# Patient Record
Sex: MALE | Race: OTHER | Employment: FULL TIME
[De-identification: names, ages, dates, MRNs, and addresses within clinical notes are randomized per-mention and may not be internally consistent; named-entity substitution may affect disease eponyms.]

---

## 2024-07-24 ENCOUNTER — APPOINTMENT (OUTPATIENT)
Facility: HOSPITAL | Age: 39
End: 2024-07-24
Payer: COMMERCIAL

## 2024-07-24 ENCOUNTER — HOSPITAL ENCOUNTER (EMERGENCY)
Facility: HOSPITAL | Age: 39
Discharge: HOME | End: 2024-07-24
Payer: COMMERCIAL

## 2024-07-24 VITALS
WEIGHT: 178 LBS | TEMPERATURE: 98.3 F | HEIGHT: 65 IN | BODY MASS INDEX: 29.66 KG/M2 | DIASTOLIC BLOOD PRESSURE: 93 MMHG | SYSTOLIC BLOOD PRESSURE: 145 MMHG | OXYGEN SATURATION: 100 % | RESPIRATION RATE: 16 BRPM | HEART RATE: 75 BPM

## 2024-07-24 DIAGNOSIS — S82.832A CLOSED FRACTURE OF DISTAL END OF LEFT FIBULA, UNSPECIFIED FRACTURE MORPHOLOGY, INITIAL ENCOUNTER: ICD-10-CM

## 2024-07-24 DIAGNOSIS — S52.572A OTHER CLOSED INTRA-ARTICULAR FRACTURE OF DISTAL END OF LEFT RADIUS, INITIAL ENCOUNTER: Primary | ICD-10-CM

## 2024-07-24 PROCEDURE — 6370000000 HC RX 637 (ALT 250 FOR IP)

## 2024-07-24 PROCEDURE — 73110 X-RAY EXAM OF WRIST: CPT

## 2024-07-24 PROCEDURE — 25605 CLTX DST RDL FX/EPHYS SEP W/: CPT

## 2024-07-24 PROCEDURE — 99283 EMERGENCY DEPT VISIT LOW MDM: CPT

## 2024-07-24 RX ORDER — OXYCODONE HYDROCHLORIDE AND ACETAMINOPHEN 5; 325 MG/1; MG/1
1 TABLET ORAL
Status: COMPLETED | OUTPATIENT
Start: 2024-07-24 | End: 2024-07-24

## 2024-07-24 RX ORDER — OXYCODONE HYDROCHLORIDE AND ACETAMINOPHEN 5; 325 MG/1; MG/1
1 TABLET ORAL EVERY 6 HOURS PRN
Qty: 9 TABLET | Refills: 0 | Status: SHIPPED | OUTPATIENT
Start: 2024-07-24 | End: 2024-07-27

## 2024-07-24 RX ADMIN — OXYCODONE HYDROCHLORIDE AND ACETAMINOPHEN 1 TABLET: 5; 325 TABLET ORAL at 11:31

## 2024-07-24 NOTE — DISCHARGE INSTRUCTIONS
Thank you!  Thank you for allowing me to care for you in the emergency department. It is my goal to provide you with excellent care. If you have not received excellent quality care, please ask to speak to the nurse manager. Please fill out the survey that will come to you by mail or email since we listen to your feedback!     Below you will find a list of your tests from today's visit.  Should you have any questions, please do not hesitate to call the emergency department.    Labs  No results found for this or any previous visit (from the past 12 hour(s)).    Radiologic Studies  XR WRIST LEFT (MIN 3 VIEWS)   Final Result   Distal radial and ulnar fractures as described      Electronically signed by Rod Tejeda      XR WRIST LEFT (MIN 3 VIEWS)    (Results Pending)     [unfilled]  [unfilled]  ------------------------------------------------------------------------------------------------------------  The exam and treatment you received in the Emergency Department were for an urgent problem and are not intended as complete care. It is important that you follow-up with a doctor, nurse practitioner, or physician assistant to:  (1) confirm your diagnosis,  (2) re-evaluation of changes in your illness and treatment, and  (3) for ongoing care. Please take your discharge instructions with you when you go to your follow-up appointment.     If you have any problem arranging a follow-up appointment, contact the Emergency Department.  If your symptoms become worse or you do not improve as expected and you are unable to reach your health care provider, please return to the Emergency Department. We are available 24 hours a day.     If a prescription has been provided, please have it filled as soon as possible to prevent a delay in treatment. If you have any questions or reservations about taking the medication due to side effects or interactions with other medications, please call your primary care provider or contact the ER.

## 2024-07-24 NOTE — ED PROVIDER NOTES
[facility] EMERGENCY DEPT  EMERGENCY DEPARTMENT HISTORY AND PHYSICAL EXAM      Date: 7/24/2024  Patient Name: Tani Diez  MRN: [id number]  YOB: 1985  Date of evaluation: 7/24/2024  Provider: Gala Doherty PA-C   Note Started: 10:49 AM EDT 7/24/24    HISTORY OF PRESENT ILLNESS     Chief Complaint   Patient presents with    Wrist Injury       History Provided By: Patient    HPI: Tani Diez is a 39 y.o. male with no significant past medical history, presents for wrist injury. 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 He immediately applied ice. He denies any numbness tingling or weakness in the hand.     PAST MEDICAL HISTORY   Past Medical History:  No past medical history on file.    Past Surgical History:  No past surgical history on file.    Family History:  No family history on file.    Social History:       Allergies:  No Known Allergies    PCP: No primary care provider on file.    Current Meds:   No current facility-administered medications for this encounter.     Current Outpatient Medications   Medication Sig Dispense Refill    oxyCODONE-acetaminophen (PERCOCET) 5-325 MG per tablet Take 1 tablet by mouth every 6 hours as needed for Pain for up to 3 days. Intended supply: 3 days. Take lowest dose possible to manage pain Max Daily Amount: 4 tablets 9 tablet 0       Social Determinants of Health:   Social Determinants of Health     Tobacco Use: Not on file   Alcohol Use: Not At Risk (7/24/2024)    AUDIT-C     Frequency of Alcohol Consumption: Never     Average Number of Drinks: Patient does not drink     Frequency of Binge Drinking: Never   Financial Resource Strain: Not on file   Food Insecurity: Not on file   Transportation Needs: Not on file   Physical Activity: Not on file   Stress: Not on file   Social Connections: Not on file   Intimate Partner Violence: Not on file   Depression: Not on file   Housing Stability: Not on file

## 2024-07-26 ENCOUNTER — OFFICE VISIT (OUTPATIENT)
Age: 39
End: 2024-07-26

## 2024-07-26 ENCOUNTER — TELEPHONE (OUTPATIENT)
Age: 39
End: 2024-07-26

## 2024-07-26 ENCOUNTER — HOSPITAL ENCOUNTER (OUTPATIENT)
Facility: HOSPITAL | Age: 39
Discharge: HOME | End: 2024-07-26
Attending: ORTHOPAEDIC SURGERY
Payer: COMMERCIAL

## 2024-07-26 VITALS
DIASTOLIC BLOOD PRESSURE: 95 MMHG | HEIGHT: 65 IN | HEART RATE: 89 BPM | WEIGHT: 175.49 LBS | SYSTOLIC BLOOD PRESSURE: 155 MMHG | OXYGEN SATURATION: 95 % | BODY MASS INDEX: 29.24 KG/M2 | TEMPERATURE: 98.8 F

## 2024-07-26 DIAGNOSIS — S52.572A OTHER CLOSED INTRA-ARTICULAR FRACTURE OF DISTAL END OF LEFT RADIUS, INITIAL ENCOUNTER: Primary | ICD-10-CM

## 2024-07-26 DIAGNOSIS — S52.572A CLOSED DIE PUNCH FRACTURE OF DISTAL RADIUS, LEFT, INITIAL ENCOUNTER: ICD-10-CM

## 2024-07-26 DIAGNOSIS — S52.572A OTHER CLOSED INTRA-ARTICULAR FRACTURE OF DISTAL END OF LEFT RADIUS, INITIAL ENCOUNTER: ICD-10-CM

## 2024-07-26 PROCEDURE — 73200 CT UPPER EXTREMITY W/O DYE: CPT

## 2024-07-26 RX ORDER — SODIUM CHLORIDE 0.9 % (FLUSH) 0.9 %
5-40 SYRINGE (ML) INJECTION PRN
OUTPATIENT
Start: 2024-07-26

## 2024-07-26 RX ORDER — ACETAMINOPHEN 325 MG/1
1000 TABLET ORAL ONCE
OUTPATIENT
Start: 2024-07-26 | End: 2024-07-26

## 2024-07-26 RX ORDER — CELECOXIB 200 MG/1
200 CAPSULE ORAL ONCE
OUTPATIENT
Start: 2024-07-26 | End: 2024-07-26

## 2024-07-26 RX ORDER — SODIUM CHLORIDE 0.9 % (FLUSH) 0.9 %
5-40 SYRINGE (ML) INJECTION EVERY 12 HOURS SCHEDULED
OUTPATIENT
Start: 2024-07-26

## 2024-07-26 RX ORDER — SODIUM CHLORIDE 9 MG/ML
INJECTION, SOLUTION INTRAVENOUS PRN
OUTPATIENT
Start: 2024-07-26

## 2024-07-26 NOTE — H&P (VIEW-ONLY)
SUBJECTIVE/OBJECTIVE:  Tani Diez (: 1985) is a 39 y.o. male.    He is right hand dominant coming in with left distal radius fracture after a fall at work on on 24 off the bed of a trailer.  He was seen in the ED where x-rays were done and he underwent closed reduction and placement in a sugartong splint.  He reports some pain over left shoulder, left sided ribs, left thigh.  He is taking percocet for pain.  He denies numbness/paresthesias.        No Known Allergies    Current Outpatient Medications   Medication Sig Dispense Refill    oxyCODONE-acetaminophen (PERCOCET) 5-325 MG per tablet Take 1 tablet by mouth every 6 hours as needed for Pain for up to 3 days. Intended supply: 3 days. Take lowest dose possible to manage pain Max Daily Amount: 4 tablets 9 tablet 0     No current facility-administered medications for this visit.        Social History     Socioeconomic History    Marital status:      Spouse name: Not on file    Number of children: Not on file    Years of education: Not on file    Highest education level: Not on file   Occupational History    Not on file   Tobacco Use    Smoking status: Not on file    Smokeless tobacco: Not on file   Substance and Sexual Activity    Alcohol use: Not on file    Drug use: Not on file    Sexual activity: Not on file   Other Topics Concern    Not on file   Social History Narrative    Not on file     Social Determinants of Health     Financial Resource Strain: Not on file   Food Insecurity: Not on file   Transportation Needs: Not on file   Physical Activity: Not on file   Stress: Not on file   Social Connections: Not on file   Intimate Partner Violence: Not on file   Housing Stability: Not on file       No past medical history on file.   None    No past surgical history on file.  None    No family history on file.            REVIEW OF SYSTEMS:    Patient denies any recent fever, chills, nausea, vomiting, chest pain, or shortness of

## 2024-07-26 NOTE — PROGRESS NOTES
Identified pt with two pt identifiers (name and ). Reviewed chart in preparation for visit and have obtained necessary documentation.    Tani Diez is a 39 y.o. male  Chief Complaint   Patient presents with    Wrist Injury     Reason for Visit: NP, ED F/U, Left Wrist Pain   Pain level: 5  Patient states he fell from the left side, front of a trailer and fell to the ground. The employer is paying for this injury. (WC claim) The medication provided for pain management is not working and does not work for the inflammation. The pain is a throbbing pain and it comes and goes. Patient states the next day after fall he has pain on his entire left side (back, shoulder, leg)     used during the visit: Dialoggy #: [id number]     BP (!) 155/95 (Site: Right Upper Arm, Position: Sitting, Cuff Size: Medium Adult)   Pulse 89   Temp 98.8 °F (37.1 °C) (Oral)   Ht 1.651 m (5' 5\")   Wt 79.6 kg (175 lb 7.8 oz)   SpO2 95%   BMI 29.20 kg/m²     1. Have you been to the ER, urgent care clinic since your last visit?  Hospitalized since your last visit?yes - [facility], 24, Left Wrist pain     2. Have you seen or consulted any other health care providers outside of the [facility] since your last visit?  Include any pap smears or colon screening. No    Patient has MyChart, just has to set it up.  
to authenticate this note.  -- Robyn Rosa MD

## 2024-07-26 NOTE — TELEPHONE ENCOUNTER
Placed call out to patient's employer, Kalyn [employer], to try and obtain the W/C info. Was given the following information:   W/C Company: [employer]  Claim #: [id number]  W/C Address: [address] [place] [id number]  Name of : Bisi Giang, phone #: [phone] ext. [phone]  : None assigned as of yet    Placed call to Bisi Giang and ANIRUDH asking if auth letter can be sent, where can we start sending office notes to get things authorized and if there is anymore info she may give us. Left our contact info in VM.

## 2024-07-29 ENCOUNTER — TELEPHONE (OUTPATIENT)
Age: 39
End: 2024-07-29

## 2024-07-29 ENCOUNTER — ANESTHESIA EVENT (OUTPATIENT)
Facility: HOSPITAL | Age: 39
End: 2024-07-29
Payer: COMMERCIAL

## 2024-07-29 NOTE — TELEPHONE ENCOUNTER
Patient called in requesting to know more information about their surgery tomorrow with Dr. Rosa.  was on the line with us, informed patient hospital should be calling by end of day today with more info. Patient understood.

## 2024-07-30 ENCOUNTER — HOSPITAL ENCOUNTER (OUTPATIENT)
Facility: HOSPITAL | Age: 39
Setting detail: OUTPATIENT SURGERY
Discharge: HOME | End: 2024-07-30
Attending: ORTHOPAEDIC SURGERY | Admitting: ORTHOPAEDIC SURGERY
Payer: COMMERCIAL

## 2024-07-30 ENCOUNTER — APPOINTMENT (OUTPATIENT)
Facility: HOSPITAL | Age: 39
End: 2024-07-30
Attending: ORTHOPAEDIC SURGERY
Payer: COMMERCIAL

## 2024-07-30 ENCOUNTER — ANESTHESIA (OUTPATIENT)
Facility: HOSPITAL | Age: 39
End: 2024-07-30
Payer: COMMERCIAL

## 2024-07-30 VITALS
RESPIRATION RATE: 18 BRPM | SYSTOLIC BLOOD PRESSURE: 152 MMHG | DIASTOLIC BLOOD PRESSURE: 98 MMHG | OXYGEN SATURATION: 96 % | HEART RATE: 89 BPM | TEMPERATURE: 98.2 F

## 2024-07-30 DIAGNOSIS — S52.572A CLOSED DIE PUNCH FRACTURE OF DISTAL RADIUS, LEFT, INITIAL ENCOUNTER: Primary | ICD-10-CM

## 2024-07-30 PROCEDURE — 7100000010 HC PHASE II RECOVERY - FIRST 15 MIN: Performed by: ORTHOPAEDIC SURGERY

## 2024-07-30 PROCEDURE — 3700000000 HC ANESTHESIA ATTENDED CARE: Performed by: ORTHOPAEDIC SURGERY

## 2024-07-30 PROCEDURE — 3600000014 HC SURGERY LEVEL 4 ADDTL 15MIN: Performed by: ORTHOPAEDIC SURGERY

## 2024-07-30 PROCEDURE — C1713 ANCHOR/SCREW BN/BN,TIS/BN: HCPCS | Performed by: ORTHOPAEDIC SURGERY

## 2024-07-30 PROCEDURE — 6370000000 HC RX 637 (ALT 250 FOR IP): Performed by: ORTHOPAEDIC SURGERY

## 2024-07-30 PROCEDURE — 2580000003 HC RX 258

## 2024-07-30 PROCEDURE — 6360000002 HC RX W HCPCS: Performed by: NURSE ANESTHETIST, CERTIFIED REGISTERED

## 2024-07-30 PROCEDURE — 2709999900 HC NON-CHARGEABLE SUPPLY: Performed by: ORTHOPAEDIC SURGERY

## 2024-07-30 PROCEDURE — 6360000002 HC RX W HCPCS

## 2024-07-30 PROCEDURE — 7100000011 HC PHASE II RECOVERY - ADDTL 15 MIN: Performed by: ORTHOPAEDIC SURGERY

## 2024-07-30 PROCEDURE — 6360000002 HC RX W HCPCS: Performed by: ORTHOPAEDIC SURGERY

## 2024-07-30 PROCEDURE — 76000 FLUOROSCOPY <1 HR PHYS/QHP: CPT

## 2024-07-30 PROCEDURE — 3700000001 HC ADD 15 MINUTES (ANESTHESIA): Performed by: ORTHOPAEDIC SURGERY

## 2024-07-30 PROCEDURE — 2500000003 HC RX 250 WO HCPCS

## 2024-07-30 PROCEDURE — 3600000004 HC SURGERY LEVEL 4 BASE: Performed by: ORTHOPAEDIC SURGERY

## 2024-07-30 PROCEDURE — 7100000000 HC PACU RECOVERY - FIRST 15 MIN: Performed by: ORTHOPAEDIC SURGERY

## 2024-07-30 PROCEDURE — 2500000003 HC RX 250 WO HCPCS: Performed by: NURSE ANESTHETIST, CERTIFIED REGISTERED

## 2024-07-30 PROCEDURE — 2580000003 HC RX 258: Performed by: ORTHOPAEDIC SURGERY

## 2024-07-30 PROCEDURE — 6370000000 HC RX 637 (ALT 250 FOR IP): Performed by: ANESTHESIOLOGY

## 2024-07-30 PROCEDURE — 2720000010 HC SURG SUPPLY STERILE: Performed by: ORTHOPAEDIC SURGERY

## 2024-07-30 PROCEDURE — 2500000003 HC RX 250 WO HCPCS: Performed by: ANESTHESIOLOGY

## 2024-07-30 PROCEDURE — 7100000001 HC PACU RECOVERY - ADDTL 15 MIN: Performed by: ORTHOPAEDIC SURGERY

## 2024-07-30 DEVICE — SCREW BNE L16MM DIA2.4MM DST RAD VOLAR S STL ST VAR ANG LOK: Type: IMPLANTABLE DEVICE | Site: WRIST | Status: FUNCTIONAL

## 2024-07-30 DEVICE — SCREW BNE L16MM DIA2.7MM CORT S STL ST T8 STARDRV RECESS: Type: IMPLANTABLE DEVICE | Site: WRIST | Status: FUNCTIONAL

## 2024-07-30 DEVICE — PLATE BNE L45MM 6X2 H L VOLAR DST RAD S STL VAR ANG LOK: Type: IMPLANTABLE DEVICE | Site: WRIST | Status: FUNCTIONAL

## 2024-07-30 DEVICE — SCREW BNE L18MM DIA2.4MM DST RAD VOLAR S STL ST VAR ANG LOK: Type: IMPLANTABLE DEVICE | Site: WRIST | Status: FUNCTIONAL

## 2024-07-30 DEVICE — SCREW BNE L20MM DIA2.4MM DST RAD VOLAR S STL ST VAR ANG LOK: Type: IMPLANTABLE DEVICE | Site: WRIST | Status: FUNCTIONAL

## 2024-07-30 RX ORDER — OXYCODONE HYDROCHLORIDE 5 MG/1
5 TABLET ORAL PRN
Status: COMPLETED | OUTPATIENT
Start: 2024-07-30 | End: 2024-07-30

## 2024-07-30 RX ORDER — OXYCODONE HYDROCHLORIDE 5 MG/1
5 TABLET ORAL EVERY 4 HOURS PRN
Qty: 30 TABLET | Refills: 0 | Status: SHIPPED | OUTPATIENT
Start: 2024-07-30 | End: 2024-08-04

## 2024-07-30 RX ORDER — SODIUM CHLORIDE 0.9 % (FLUSH) 0.9 %
5-40 SYRINGE (ML) INJECTION EVERY 12 HOURS SCHEDULED
Status: DISCONTINUED | OUTPATIENT
Start: 2024-07-30 | End: 2024-07-30 | Stop reason: HOSPADM

## 2024-07-30 RX ORDER — DEXTROSE MONOHYDRATE 100 MG/ML
INJECTION, SOLUTION INTRAVENOUS CONTINUOUS PRN
Status: DISCONTINUED | OUTPATIENT
Start: 2024-07-30 | End: 2024-07-30 | Stop reason: HOSPADM

## 2024-07-30 RX ORDER — ONDANSETRON 2 MG/ML
4 INJECTION INTRAMUSCULAR; INTRAVENOUS
Status: DISCONTINUED | OUTPATIENT
Start: 2024-07-30 | End: 2024-07-30 | Stop reason: HOSPADM

## 2024-07-30 RX ORDER — NALOXONE HYDROCHLORIDE 0.4 MG/ML
INJECTION, SOLUTION INTRAMUSCULAR; INTRAVENOUS; SUBCUTANEOUS PRN
Status: DISCONTINUED | OUTPATIENT
Start: 2024-07-30 | End: 2024-07-30 | Stop reason: HOSPADM

## 2024-07-30 RX ORDER — LIDOCAINE HYDROCHLORIDE 20 MG/ML
INJECTION, SOLUTION EPIDURAL; INFILTRATION; INTRACAUDAL; PERINEURAL PRN
Status: DISCONTINUED | OUTPATIENT
Start: 2024-07-30 | End: 2024-07-30 | Stop reason: SDUPTHER

## 2024-07-30 RX ORDER — IBUPROFEN 600 MG/1
600 TABLET ORAL 3 TIMES DAILY PRN
Qty: 90 TABLET | Refills: 2 | Status: SHIPPED | OUTPATIENT
Start: 2024-07-30

## 2024-07-30 RX ORDER — OXYCODONE HYDROCHLORIDE 5 MG/1
10 TABLET ORAL PRN
Status: COMPLETED | OUTPATIENT
Start: 2024-07-30 | End: 2024-07-30

## 2024-07-30 RX ORDER — METOCLOPRAMIDE HYDROCHLORIDE 5 MG/ML
10 INJECTION INTRAMUSCULAR; INTRAVENOUS
Status: DISCONTINUED | OUTPATIENT
Start: 2024-07-30 | End: 2024-07-30 | Stop reason: HOSPADM

## 2024-07-30 RX ORDER — SENNA AND DOCUSATE SODIUM 50; 8.6 MG/1; MG/1
1 TABLET, FILM COATED ORAL 2 TIMES DAILY PRN
Qty: 30 TABLET | Refills: 0 | Status: SHIPPED | OUTPATIENT
Start: 2024-07-30

## 2024-07-30 RX ORDER — FENTANYL CITRATE 50 UG/ML
INJECTION, SOLUTION INTRAMUSCULAR; INTRAVENOUS PRN
Status: DISCONTINUED | OUTPATIENT
Start: 2024-07-30 | End: 2024-07-30 | Stop reason: SDUPTHER

## 2024-07-30 RX ORDER — MIDAZOLAM HYDROCHLORIDE 1 MG/ML
INJECTION INTRAMUSCULAR; INTRAVENOUS PRN
Status: DISCONTINUED | OUTPATIENT
Start: 2024-07-30 | End: 2024-07-30 | Stop reason: SDUPTHER

## 2024-07-30 RX ORDER — HYDRALAZINE HYDROCHLORIDE 20 MG/ML
10 INJECTION INTRAMUSCULAR; INTRAVENOUS
Status: DISCONTINUED | OUTPATIENT
Start: 2024-07-30 | End: 2024-07-30 | Stop reason: HOSPADM

## 2024-07-30 RX ORDER — SODIUM CHLORIDE, SODIUM LACTATE, POTASSIUM CHLORIDE, CALCIUM CHLORIDE 600; 310; 30; 20 MG/100ML; MG/100ML; MG/100ML; MG/100ML
INJECTION, SOLUTION INTRAVENOUS ONCE
Status: DISCONTINUED | OUTPATIENT
Start: 2024-07-30 | End: 2024-07-30 | Stop reason: HOSPADM

## 2024-07-30 RX ORDER — DIPHENHYDRAMINE HYDROCHLORIDE 50 MG/ML
12.5 INJECTION INTRAMUSCULAR; INTRAVENOUS
Status: DISCONTINUED | OUTPATIENT
Start: 2024-07-30 | End: 2024-07-30 | Stop reason: HOSPADM

## 2024-07-30 RX ORDER — ONDANSETRON 2 MG/ML
INJECTION INTRAMUSCULAR; INTRAVENOUS PRN
Status: DISCONTINUED | OUTPATIENT
Start: 2024-07-30 | End: 2024-07-30 | Stop reason: SDUPTHER

## 2024-07-30 RX ORDER — LIDOCAINE 4 G/G
1 PATCH TOPICAL AS NEEDED
Status: DISCONTINUED | OUTPATIENT
Start: 2024-07-30 | End: 2024-07-30 | Stop reason: HOSPADM

## 2024-07-30 RX ORDER — MEPERIDINE HYDROCHLORIDE 25 MG/ML
12.5 INJECTION INTRAMUSCULAR; INTRAVENOUS; SUBCUTANEOUS EVERY 5 MIN PRN
Status: DISCONTINUED | OUTPATIENT
Start: 2024-07-30 | End: 2024-07-30 | Stop reason: HOSPADM

## 2024-07-30 RX ORDER — HYDROMORPHONE HYDROCHLORIDE 1 MG/ML
0.5 INJECTION, SOLUTION INTRAMUSCULAR; INTRAVENOUS; SUBCUTANEOUS EVERY 5 MIN PRN
Status: DISCONTINUED | OUTPATIENT
Start: 2024-07-30 | End: 2024-07-30 | Stop reason: HOSPADM

## 2024-07-30 RX ORDER — ACETAMINOPHEN 325 MG/1
1000 TABLET ORAL ONCE
Status: COMPLETED | OUTPATIENT
Start: 2024-07-30 | End: 2024-07-30

## 2024-07-30 RX ORDER — ONDANSETRON 4 MG/1
4 TABLET, ORALLY DISINTEGRATING ORAL EVERY 8 HOURS PRN
Qty: 10 TABLET | Refills: 1 | Status: SHIPPED | OUTPATIENT
Start: 2024-07-30

## 2024-07-30 RX ORDER — IPRATROPIUM BROMIDE AND ALBUTEROL SULFATE 2.5; .5 MG/3ML; MG/3ML
1 SOLUTION RESPIRATORY (INHALATION)
Status: DISCONTINUED | OUTPATIENT
Start: 2024-07-30 | End: 2024-07-30 | Stop reason: HOSPADM

## 2024-07-30 RX ORDER — BUPIVACAINE HYDROCHLORIDE 5 MG/ML
INJECTION, SOLUTION EPIDURAL; INTRACAUDAL PRN
Status: DISCONTINUED | OUTPATIENT
Start: 2024-07-30 | End: 2024-07-30 | Stop reason: HOSPADM

## 2024-07-30 RX ORDER — SODIUM CHLORIDE 0.9 % (FLUSH) 0.9 %
5-40 SYRINGE (ML) INJECTION PRN
Status: DISCONTINUED | OUTPATIENT
Start: 2024-07-30 | End: 2024-07-30 | Stop reason: HOSPADM

## 2024-07-30 RX ORDER — ACETAMINOPHEN 500 MG
500 TABLET ORAL EVERY 6 HOURS PRN
Qty: 50 TABLET | Refills: 1 | Status: SHIPPED | OUTPATIENT
Start: 2024-07-30

## 2024-07-30 RX ORDER — SODIUM CHLORIDE 9 MG/ML
INJECTION, SOLUTION INTRAVENOUS PRN
Status: DISCONTINUED | OUTPATIENT
Start: 2024-07-30 | End: 2024-07-30 | Stop reason: HOSPADM

## 2024-07-30 RX ORDER — GLUCAGON 1 MG/ML
1 KIT INJECTION PRN
Status: DISCONTINUED | OUTPATIENT
Start: 2024-07-30 | End: 2024-07-30 | Stop reason: HOSPADM

## 2024-07-30 RX ORDER — LABETALOL HYDROCHLORIDE 5 MG/ML
10 INJECTION, SOLUTION INTRAVENOUS
Status: DISCONTINUED | OUTPATIENT
Start: 2024-07-30 | End: 2024-07-30 | Stop reason: HOSPADM

## 2024-07-30 RX ORDER — FENTANYL CITRATE 50 UG/ML
50 INJECTION, SOLUTION INTRAMUSCULAR; INTRAVENOUS EVERY 5 MIN PRN
Status: DISCONTINUED | OUTPATIENT
Start: 2024-07-30 | End: 2024-07-30 | Stop reason: HOSPADM

## 2024-07-30 RX ORDER — DEXAMETHASONE SODIUM PHOSPHATE 4 MG/ML
INJECTION, SOLUTION INTRA-ARTICULAR; INTRALESIONAL; INTRAMUSCULAR; INTRAVENOUS; SOFT TISSUE PRN
Status: DISCONTINUED | OUTPATIENT
Start: 2024-07-30 | End: 2024-07-30 | Stop reason: SDUPTHER

## 2024-07-30 RX ORDER — PROPOFOL 10 MG/ML
INJECTION, EMULSION INTRAVENOUS PRN
Status: DISCONTINUED | OUTPATIENT
Start: 2024-07-30 | End: 2024-07-30 | Stop reason: SDUPTHER

## 2024-07-30 RX ORDER — SODIUM CHLORIDE, SODIUM LACTATE, POTASSIUM CHLORIDE, CALCIUM CHLORIDE 600; 310; 30; 20 MG/100ML; MG/100ML; MG/100ML; MG/100ML
INJECTION, SOLUTION INTRAVENOUS CONTINUOUS PRN
Status: DISCONTINUED | OUTPATIENT
Start: 2024-07-30 | End: 2024-07-30 | Stop reason: SDUPTHER

## 2024-07-30 RX ORDER — LORAZEPAM 2 MG/ML
0.5 INJECTION INTRAMUSCULAR
Status: DISCONTINUED | OUTPATIENT
Start: 2024-07-30 | End: 2024-07-30 | Stop reason: HOSPADM

## 2024-07-30 RX ORDER — ROCURONIUM BROMIDE 10 MG/ML
INJECTION, SOLUTION INTRAVENOUS PRN
Status: DISCONTINUED | OUTPATIENT
Start: 2024-07-30 | End: 2024-07-30 | Stop reason: SDUPTHER

## 2024-07-30 RX ORDER — CELECOXIB 200 MG/1
200 CAPSULE ORAL ONCE
Status: COMPLETED | OUTPATIENT
Start: 2024-07-30 | End: 2024-07-30

## 2024-07-30 RX ADMIN — SODIUM CHLORIDE, SODIUM LACTATE, POTASSIUM CHLORIDE, CALCIUM CHLORIDE: 600; 310; 30; 20 INJECTION, SOLUTION INTRAVENOUS at 09:52

## 2024-07-30 RX ADMIN — PROPOFOL 150 MG: 10 INJECTION, EMULSION INTRAVENOUS at 09:56

## 2024-07-30 RX ADMIN — CEFAZOLIN SODIUM 2000 MG: 1 INJECTION, POWDER, FOR SOLUTION INTRAMUSCULAR; INTRAVENOUS at 09:52

## 2024-07-30 RX ADMIN — CELECOXIB 200 MG: 200 CAPSULE ORAL at 09:36

## 2024-07-30 RX ADMIN — Medication 1 MG: at 11:37

## 2024-07-30 RX ADMIN — HYDROMORPHONE HYDROCHLORIDE 0.5 MG: 1 INJECTION, SOLUTION INTRAMUSCULAR; INTRAVENOUS; SUBCUTANEOUS at 12:22

## 2024-07-30 RX ADMIN — FENTANYL CITRATE 50 MCG: 50 INJECTION, SOLUTION INTRAMUSCULAR; INTRAVENOUS at 10:03

## 2024-07-30 RX ADMIN — MIDAZOLAM HYDROCHLORIDE 2 MG: 1 INJECTION INTRAMUSCULAR; INTRAVENOUS at 09:52

## 2024-07-30 RX ADMIN — SODIUM CHLORIDE, SODIUM LACTATE, POTASSIUM CHLORIDE, CALCIUM CHLORIDE: 600; 310; 30; 20 INJECTION, SOLUTION INTRAVENOUS at 11:08

## 2024-07-30 RX ADMIN — FENTANYL CITRATE 50 MCG: 50 INJECTION, SOLUTION INTRAMUSCULAR; INTRAVENOUS at 10:19

## 2024-07-30 RX ADMIN — ONDANSETRON 4 MG: 2 INJECTION INTRAMUSCULAR; INTRAVENOUS at 10:16

## 2024-07-30 RX ADMIN — ROCURONIUM BROMIDE 40 MG: 10 INJECTION, SOLUTION INTRAVENOUS at 10:08

## 2024-07-30 RX ADMIN — HYDROMORPHONE HYDROCHLORIDE 0.5 MG: 1 INJECTION, SOLUTION INTRAMUSCULAR; INTRAVENOUS; SUBCUTANEOUS at 12:15

## 2024-07-30 RX ADMIN — OXYCODONE 10 MG: 5 TABLET ORAL at 13:58

## 2024-07-30 RX ADMIN — LIDOCAINE HYDROCHLORIDE 100 MG: 20 INJECTION, SOLUTION EPIDURAL; INFILTRATION; INTRACAUDAL; PERINEURAL at 09:56

## 2024-07-30 RX ADMIN — PROPOFOL 50 MG: 10 INJECTION, EMULSION INTRAVENOUS at 10:02

## 2024-07-30 RX ADMIN — FENTANYL CITRATE 50 MCG: 50 INJECTION, SOLUTION INTRAMUSCULAR; INTRAVENOUS at 10:43

## 2024-07-30 RX ADMIN — DEXAMETHASONE SODIUM PHOSPHATE 8 MG: 4 INJECTION, SOLUTION INTRA-ARTICULAR; INTRALESIONAL; INTRAMUSCULAR; INTRAVENOUS; SOFT TISSUE at 10:12

## 2024-07-30 RX ADMIN — ACETAMINOPHEN 975 MG: 325 TABLET ORAL at 09:36

## 2024-07-30 RX ADMIN — FENTANYL CITRATE 50 MCG: 50 INJECTION, SOLUTION INTRAMUSCULAR; INTRAVENOUS at 11:00

## 2024-07-30 ASSESSMENT — PAIN DESCRIPTION - ORIENTATION
ORIENTATION: LEFT

## 2024-07-30 ASSESSMENT — PAIN SCALES - GENERAL
PAINLEVEL_OUTOF10: 3
PAINLEVEL_OUTOF10: 8
PAINLEVEL_OUTOF10: 10
PAINLEVEL_OUTOF10: 8
PAINLEVEL_OUTOF10: 8

## 2024-07-30 ASSESSMENT — PAIN DESCRIPTION - DESCRIPTORS
DESCRIPTORS: ACHING;DISCOMFORT
DESCRIPTORS: BURNING;THROBBING
DESCRIPTORS: ACHING;DISCOMFORT
DESCRIPTORS: ACHING;THROBBING

## 2024-07-30 ASSESSMENT — PAIN - FUNCTIONAL ASSESSMENT
PAIN_FUNCTIONAL_ASSESSMENT: 0-10

## 2024-07-30 ASSESSMENT — PAIN DESCRIPTION - LOCATION
LOCATION: ARM
LOCATION: WRIST

## 2024-07-30 NOTE — ANESTHESIA PRE PROCEDURE
Department of Anesthesiology  Preprocedure Note       Name:  Tani Diez   Age:  39 y.o.  :  1985                                          MRN:  [id number]         Date:  2024      Surgeon: Surgeon(s):  Robyn Rosa MD    Procedure: Procedure(s):  LEFT WRIST OPEN REDUCTION INTERNAL FIXATION    Medications prior to admission:   Prior to Admission medications    Medication Sig Start Date End Date Taking? Authorizing Provider   acetaminophen (TYLENOL) 500 MG tablet Take 1 tablet by mouth every 6 hours as needed for Pain 24  Yes Robyn Rosa MD   ondansetron (ZOFRAN-ODT) 4 MG disintegrating tablet Place 1 tablet under the tongue every 8 hours as needed for Nausea or Vomiting 24  Yes Robyn Rosa MD   oxyCODONE (ROXICODONE) 5 MG immediate release tablet Take 1 tablet by mouth every 4 hours as needed for Pain for up to 5 days. Intended supply: 5 days. Take lowest dose possible to manage pain Max Daily Amount: 30 mg 24 Yes Robyn Rosa MD   sennosides-docusate sodium (SENOKOT-S) 8.6-50 MG tablet Take 1 tablet by mouth 2 times daily as needed for Constipation 24  Yes Robyn Rosa MD   ibuprofen (ADVIL;MOTRIN) 600 MG tablet Take 1 tablet by mouth 3 times daily as needed for Pain 24  Yes Robyn Rosa MD       Current medications:    Current Facility-Administered Medications   Medication Dose Route Frequency Provider Last Rate Last Admin    sodium chloride flush 0.9 % injection 5-40 mL  5-40 mL IntraVENous 2 times per day Robyn Rosa MD        sodium chloride flush 0.9 % injection 5-40 mL  5-40 mL IntraVENous PRN Robyn Rosa MD        0.9 % sodium chloride infusion   IntraVENous PRN Robyn Rosa MD         Facility-Administered Medications Ordered in Other Encounters   Medication Dose Route Frequency Provider Last Rate Last Admin    midazolam (VERSED) injection   
none

## 2024-07-30 NOTE — DISCHARGE INSTRUCTIONS
** RETURN TO CARE INSTRUCTIONS **  Go the nearest emergency department immediately if you develop chest pain, shortness of breath, dizziness, temperature greater than 101.5 degrees Fahrenheit or other symptoms that you think are an emergency. If you have signs of an infection, such as a temperature over 101.5 degrees Fahrenheit, persistent wound drainage, redness, swelling, or increased pain, you should contact the [facility] Surgery [facility] immediately at [phone].     ** WEIGHT BEARING / ACTIVITY RESTRICTIONS **  Please remain non-weight bearing on operative hand until approval by your Orthopaedic Surgeon. Failure to observe these restrictions will increase your risk for complications that may delay your recovery. You may perform range of motion exercises to fingers and elbow. Keep operative extremity elevated above heart when sitting or lying down.    ** WOUND CARE **  Leave the splint and dressing in place until your first postoperative appointment.  Keep the splint clean, dry, and intact.  Cover with a plastic bag when showering and secure with tape or a rubberband.    *NEW MEDICATIONS*   Oxycodone 5 mg  - Take 1 tab by mouth every 4 hours as needed for pain - This is an opioid pain medication. It can impair your coordination and cause drowsiness. Do not take this medication while operating heavy machinery, driving automobiles, or performing other hazardous tasks. - This medication may cause constipation. Please take stool softening medications as prescribed or consider over-the-counter fiber supplements (e.g Metamucil) or laxative of choice as needed.    Ibuprofen 600mg:  - Take 1 tab by mouth every 8 hours for pain - This is a non-steroidal anti-inflammatory (NSAID) pain medication - To avoid risk of GI bleeding or kidney complications when combined with other NSAIDs, please review labels other over-the-counter medications and follow dosing directions before taking.     Zofran 4mg: - Dissolve 1 tab on

## 2024-07-30 NOTE — PERIOP NOTE
Discharge instructions printed and provided to patient and family with all questions answered in full using . PIV x1 removed with cath tip intact. Pt VSS and no signs of distress noted. Pt tolerating liquids and solids with no issues. Pt ambulating within room with no issues. Pt wheeled down to main entrance accompanied by staff. Pt condition stable at time of discharge.      Sling provided and applied to pt affected arm. Pt aware of date/time of follow-up appointment.

## 2024-07-30 NOTE — INTERVAL H&P NOTE
Update History & Physical    The patient's History and Physical of July 26, 2024 was reviewed with the patient and I examined the patient. There was no change. The surgical site was confirmed by the patient and me.     Plan: The risks, benefits, expected outcome, and alternative to the recommended procedure have been discussed with the patient. Patient understands and wants to proceed with the procedure.     Electronically signed by Robyn Rosa MD on 7/30/2024 at 9:24 AM

## 2024-07-30 NOTE — OP NOTE
Operative Note      Patient: Tani Diez  YOB: 1985  MRN: [id number]    Date of Procedure: 7/30/2024    Pre-Op Diagnosis Codes:     * Closed die punch fracture of distal radius, left, initial encounter [S52.572A]    Post-Op Diagnosis: Same       Procedure(s):  LEFT WRIST OPEN REDUCTION INTERNAL FIXATION    Surgeon(s):  Robyn Rosa MD    Assistant:   Surgical Assistant: Lilli Trammell    Anesthesia: General    Estimated Blood Loss (mL): less than 50     Complications: None    Specimens:   * No specimens in log *    Implants:  * No implants in log *      Drains: * No LDAs found *    Findings:  Infection Present At Time Of Surgery (PATOS) (choose all levels that have infection present):  No infection present  Other Findings: Left distal radius intra-articular fracture with dorsal comminution including involvement of the lunate fossa    Indications for the Procedure:  39-year-old right-hand-dominant  male who sustained 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 Discussed with the patient given the amount of dorsal comminution and dorsal tilt this increases the risk of further displacement of the fracture and if the fracture heals as it is this can lead todecreased range of motion and strength and posttraumatic arthritis of the wrist joint. Would recommend operative fixation with left distal radius with open reduction internal fixation . The risks, benefits, and alternatives of the above procedure were explained to the patient including the risks of infection, bleeding, damage to surrounding nerves, blood vessels, tendons, need for further procedures, stiffness, chronic pain, wound healing issues, DVTs, posttraumatic osteoarthritis, nonunion and malunion, carpal tunnel syndrome. The patient filled out informed consent.    Detailed Description of the Procedure  The patient was met in the

## 2024-08-12 ENCOUNTER — OFFICE VISIT (OUTPATIENT)
Age: 39
End: 2024-08-12

## 2024-08-12 VITALS
SYSTOLIC BLOOD PRESSURE: 139 MMHG | BODY MASS INDEX: 29.02 KG/M2 | TEMPERATURE: 98.5 F | RESPIRATION RATE: 16 BRPM | HEART RATE: 93 BPM | WEIGHT: 174.16 LBS | OXYGEN SATURATION: 97 % | DIASTOLIC BLOOD PRESSURE: 90 MMHG | HEIGHT: 65 IN

## 2024-08-12 DIAGNOSIS — S52.572D OTHER CLOSED INTRA-ARTICULAR FRACTURE OF DISTAL END OF LEFT RADIUS WITH ROUTINE HEALING, SUBSEQUENT ENCOUNTER: Primary | ICD-10-CM

## 2024-08-12 PROCEDURE — 99024 POSTOP FOLLOW-UP VISIT: CPT | Performed by: ORTHOPAEDIC SURGERY

## 2024-08-12 RX ORDER — OXYCODONE HYDROCHLORIDE 5 MG/1
5 TABLET ORAL EVERY 6 HOURS PRN
Qty: 20 TABLET | Refills: 0 | Status: SHIPPED | OUTPATIENT
Start: 2024-08-12 | End: 2024-08-17

## 2024-08-12 NOTE — PROGRESS NOTES
Chief Complaint   Patient presents with    Post-Op Check     BP (!) 139/90   Pulse 93   Temp 98.5 °F (36.9 °C)   Resp 16   Ht 1.651 m (5' 5\")   Wt 79 kg (174 lb 2.6 oz)   SpO2 97%   BMI 28.98 kg/m²   \"Have you been to the ER, urgent care clinic since your last visit?  Hospitalized since your last visit?\"    NO    “Have you seen or consulted any other health care providers outside of [facility] since your last visit?”    NO            Click Here for Release of Records Request

## 2024-08-12 NOTE — PROGRESS NOTES
Mr.Pucheta Diez  is here for a follow up visit after undergoing Left Wrist Open Reduction Internal Fixation - Left on 7/30/2024.    Pain has been appropriate since surgery, no major medical complications since surgery. Patient reports pain is controlled on oxycodone, tylenol and ibuprofen.    The patient denies fevers, chills, nausea, vomiting. He endorses some numbness/paresthesias.  The patient has been following the weight bearing precautions: NWB LUE in splint.  The patient has not started physical therapy.     Current Outpatient Medications on File Prior to Visit   Medication Sig Dispense Refill    acetaminophen (TYLENOL) 500 MG tablet Take 1 tablet by mouth every 6 hours as needed for Pain 50 tablet 1    ondansetron (ZOFRAN-ODT) 4 MG disintegrating tablet Place 1 tablet under the tongue every 8 hours as needed for Nausea or Vomiting 10 tablet 1    sennosides-docusate sodium (SENOKOT-S) 8.6-50 MG tablet Take 1 tablet by mouth 2 times daily as needed for Constipation 30 tablet 0    ibuprofen (ADVIL;MOTRIN) 600 MG tablet Take 1 tablet by mouth 3 times daily as needed for Pain 90 tablet 2     No current facility-administered medications on file prior to visit.       ROS:  General: denies agitation, fevers/chills  Cardiac: denies major chest pain  Gastrointestinal: denies nausea/vomiting  Neurologic: Denies numbness/paresthesias  Skin: Denies erythema, warmth to touch, active drainage  Musculoskeletal: Endorses appropriate pain at surgical site      Physical Examination:    There were no vitals taken for this visit.    GENERAL: Patient is a healthy-appearing and in no apparent distress. Afebrile, normotensive, regular rate  MENTAL STATUS: Alert and oriented to time, place, person; mood and affect normal.  PULMONARY: Respiratory rate normal and non-labored on room air.  GASTROINTESTINAL: Nondistended abdomen  CARDIAC: Regular rate and rhythm. Without pitting edema of affected extremity and peripheral pulses normal

## 2024-09-04 ENCOUNTER — HOSPITAL ENCOUNTER (OUTPATIENT)
Facility: HOSPITAL | Age: 39
Setting detail: RECURRING SERIES
Discharge: HOME | End: 2024-09-07
Payer: COMMERCIAL

## 2024-09-04 PROCEDURE — 97535 SELF CARE MNGMENT TRAINING: CPT

## 2024-09-04 PROCEDURE — 97110 THERAPEUTIC EXERCISES: CPT

## 2024-09-04 PROCEDURE — 97165 OT EVAL LOW COMPLEX 30 MIN: CPT

## 2024-09-04 NOTE — THERAPY EVALUATION
to promote ability to grasp/hold items at work   [] Met [] Not met [] Partially met  Date:   3.  Patient will demonstrate left hand  strength of at least 25 lbs in preparation for work tasks requiring  grasping /holding items ie steering wheel, levers on equipment    [] Met [] Not met [] Partially met  Date:   4.  Patient will be mod I with tying footwear (shoes, boats), etc in preparation for return to work    [] Met [] Not met [] Partially met  Date:     Long Term Goals: To be accomplished in 12 treatments.   Patient will demonstrate a 10 % gain on the Ampac   [] Met [] Not met [] Partially met  Date:   2.  Patient will be mod I with all dressing tasks to ensure proper  attire is used/worn for job duties   [] Met [] Not met [] Partially met  Date:   3.  Patient will demonstrate L hand  and pinch strength that are WFL's for his age group in order to independently perform all required job duties   [] Met [] Not met [] Partially met  Date:   4.  Patient will demonstrate left hand FMC/dexterity that is WFL's for his age group in order to safely and independently manipulate small tools, knobs, levers, etc on work equipment   [] Met [] Not met [] Partially met  Date:     Frequency / Duration: Patient to be seen 1- 2 times per week for 12 treatments.    Patient/ Caregiver education and instruction: Diagnosis, prognosis, activity modification, brace/ splint application, and exercises   [x]  Plan of care has been reviewed with YUE Quiros OT       9/4/2024       8:35 AM        ===================================================================  I certify that the above Therapy Services are being furnished while the patient is under my care. I agree with the treatment plan and certify that this therapy is necessary.    Physician's Signature:_________________________   DATE:_________   TIME:________                           Robyn Rosa*    ** Signature, Date and Time must be completed

## 2024-09-06 ENCOUNTER — HOSPITAL ENCOUNTER (OUTPATIENT)
Facility: HOSPITAL | Age: 39
Discharge: HOME | End: 2024-09-09
Payer: COMMERCIAL

## 2024-09-06 DIAGNOSIS — S52.572D OTHER CLOSED INTRA-ARTICULAR FRACTURE OF DISTAL END OF LEFT RADIUS WITH ROUTINE HEALING, SUBSEQUENT ENCOUNTER: ICD-10-CM

## 2024-09-06 PROCEDURE — 73110 X-RAY EXAM OF WRIST: CPT

## 2024-09-09 ENCOUNTER — OFFICE VISIT (OUTPATIENT)
Age: 39
End: 2024-09-09

## 2024-09-09 VITALS
HEIGHT: 65 IN | SYSTOLIC BLOOD PRESSURE: 133 MMHG | HEART RATE: 68 BPM | WEIGHT: 173.28 LBS | OXYGEN SATURATION: 94 % | DIASTOLIC BLOOD PRESSURE: 86 MMHG | BODY MASS INDEX: 28.87 KG/M2 | TEMPERATURE: 97.8 F | RESPIRATION RATE: 16 BRPM

## 2024-09-09 DIAGNOSIS — M25.512 ACUTE PAIN OF LEFT SHOULDER: ICD-10-CM

## 2024-09-09 DIAGNOSIS — M75.82 ROTATOR CUFF TENDINITIS, LEFT: ICD-10-CM

## 2024-09-09 DIAGNOSIS — S52.572D OTHER CLOSED INTRA-ARTICULAR FRACTURE OF DISTAL END OF LEFT RADIUS WITH ROUTINE HEALING, SUBSEQUENT ENCOUNTER: Primary | ICD-10-CM

## 2024-09-09 PROCEDURE — 99024 POSTOP FOLLOW-UP VISIT: CPT | Performed by: ORTHOPAEDIC SURGERY

## 2024-09-11 ENCOUNTER — HOSPITAL ENCOUNTER (OUTPATIENT)
Facility: HOSPITAL | Age: 39
Setting detail: RECURRING SERIES
Discharge: HOME | End: 2024-09-14
Payer: COMMERCIAL

## 2024-09-11 PROCEDURE — 97110 THERAPEUTIC EXERCISES: CPT

## 2024-09-11 PROCEDURE — 97140 MANUAL THERAPY 1/> REGIONS: CPT

## 2024-09-13 ENCOUNTER — HOSPITAL ENCOUNTER (OUTPATIENT)
Facility: HOSPITAL | Age: 39
Setting detail: RECURRING SERIES
Discharge: HOME | End: 2024-09-16
Payer: COMMERCIAL

## 2024-09-13 PROCEDURE — 97110 THERAPEUTIC EXERCISES: CPT

## 2024-09-18 ENCOUNTER — HOSPITAL ENCOUNTER (OUTPATIENT)
Facility: HOSPITAL | Age: 39
Setting detail: RECURRING SERIES
Discharge: HOME | End: 2024-09-21
Payer: COMMERCIAL

## 2024-09-18 PROCEDURE — 97110 THERAPEUTIC EXERCISES: CPT

## 2024-09-20 ENCOUNTER — HOSPITAL ENCOUNTER (OUTPATIENT)
Facility: HOSPITAL | Age: 39
Setting detail: RECURRING SERIES
Discharge: HOME | End: 2024-09-23
Payer: COMMERCIAL

## 2024-09-20 PROCEDURE — 97110 THERAPEUTIC EXERCISES: CPT

## 2024-09-25 ENCOUNTER — HOSPITAL ENCOUNTER (OUTPATIENT)
Facility: HOSPITAL | Age: 39
Setting detail: RECURRING SERIES
Discharge: HOME | End: 2024-09-28
Payer: COMMERCIAL

## 2024-09-25 PROCEDURE — 97110 THERAPEUTIC EXERCISES: CPT

## 2024-09-27 ENCOUNTER — HOSPITAL ENCOUNTER (OUTPATIENT)
Facility: HOSPITAL | Age: 39
Setting detail: RECURRING SERIES
Discharge: HOME | End: 2024-09-30
Payer: COMMERCIAL

## 2024-09-27 PROCEDURE — 97110 THERAPEUTIC EXERCISES: CPT

## 2024-09-27 NOTE — PROGRESS NOTES
OCCUPATIONAL THERAPY - DAILY TREATMENT NOTE (updated 3/23)      Date: 2024          Patient Name:  Tani Diez :  1985   Medical   Diagnosis:  Other closed intra-articular fracture of distal end of left radius with routine healing, subsequent encounter [P29.608E] Treatment Diagnosis:  M25.532  LEFT WRIST PAIN, M25.632  Stiffness of left wrist, M25.642  Stiffness of left hand, and M79.642  Pain in left hand    Referral Source:  Robyn Rosa* Insurance:   Payor: GENERIC MCO / Plan: GENERIC MCO WC / Product Type: *No Product type* /                     Patient  verified yes     Visit #   Current  / Total 7 12   Time   In / Out 922 1005   Total Treatment Time 43   Total Timed Codes 43         SUBJECTIVE    Pain Level (0-10 scale):4   L wrist  Any medication changes, allergies to medications, adverse drug reactions, diagnosis change, or new procedure performed?: [x] No    [] Yes (see summary sheet for update)  Medications: Verified on Patient Summary List    Subjective functional status/changes:     --patient agreeable to transfer to [place] location for remaining OT visits  --PT for L shoulder has been approved/to be scheduled  --patient notes numbness/tingling on the back of the left hand is a little better  -pain with wrist ROM continues, flexion > extension    POST OP: 8 weeks (24)    OBJECTIVE      Therapeutic Procedures:  Tx Min Billable or 1:1 Min (if diff from Tx Min) Procedure, Rationale, Specifics   43 43 [id number] Therapeutic Exercise (timed):  increase ROM, strength, coordination, balance, and proprioception to improve patient's ability to progress to PLOF and address remaining functional goals. (see flow sheet as applicable)    Details if applicable:  LUE  RoM, strength ex     14408 Manual Therapy (timed):  increase tissue extensibility and decrease edema to improve patient's ability to progress to PLOF and address remaining functional goals.  The manual

## 2024-10-02 ENCOUNTER — APPOINTMENT (OUTPATIENT)
Facility: HOSPITAL | Age: 39
End: 2024-10-02
Payer: COMMERCIAL

## 2024-10-02 ENCOUNTER — HOSPITAL ENCOUNTER (OUTPATIENT)
Facility: HOSPITAL | Age: 39
Setting detail: RECURRING SERIES
Discharge: HOME | End: 2024-10-05
Payer: COMMERCIAL

## 2024-10-02 PROCEDURE — 97530 THERAPEUTIC ACTIVITIES: CPT

## 2024-10-02 PROCEDURE — 97110 THERAPEUTIC EXERCISES: CPT

## 2024-10-02 NOTE — PROGRESS NOTES
OCCUPATIONAL THERAPY - DAILY TREATMENT NOTE (updated 3/23)      Date: 10/2/2024          Patient Name:  Tani Diez :  1985   Medical   Diagnosis:  Other closed intra-articular fracture of distal end of left radius with routine healing, subsequent encounter [O02.359D] Treatment Diagnosis:  M25.532  LEFT WRIST PAIN, M25.632  Stiffness of left wrist, M25.642  Stiffness of left hand, and M79.642  Pain in left hand    Referral Source:  Robyn Rosa* Insurance:   Payor: GENERIC MCO / Plan: GENERIC M[employer] WC / Product Type: *No Product type* /                     Patient  verified yes     Visit #   Current  / Total 8 12   Time   In / Out 412 516   Total Treatment Time 64   Total Timed Codes 60         SUBJECTIVE    Pain Level (0-10 scale):4   L wrist  Any medication changes, allergies to medications, adverse drug reactions, diagnosis change, or new procedure performed?: [x] No    [] Yes (see summary sheet for update)  Medications: Verified on Patient Summary List    Subjective functional status/changes:     Patient states he wears the splint when going to a store, etc  does not wear the plint while driving.     POST OP: 9 weeks (24)    OBJECTIVE      Therapeutic Procedures:  Tx Min Billable or 1:1 Min (if diff from Tx Min) Procedure, Rationale, Specifics   45 95 45110 Therapeutic Exercise (timed):  increase ROM, strength, coordination, balance, and proprioception to improve patient's ability to progress to PLOF and address remaining functional goals. (see flow sheet as applicable)    Details if applicable:  LUE  RoM, strength ex     32157 Manual Therapy (timed):  increase tissue extensibility and decrease edema to improve patient's ability to progress to PLOF and address remaining functional goals.  The manual therapy interventions were performed at a separate and distinct time from the therapeutic activities interventions . (see flow sheet as applicable)    Details if applicable:

## 2024-10-02 NOTE — PROGRESS NOTES
Michael Nguyen [facility]  [address], [address]  [place] [address]  Phone: [phone]    Fax: [phone]    OCCUPATIONAL THERAPY PROGRESS NOTE  Patient Name:  Tani Diez :  1985   Treatment/Medical Diagnosis:  Other closed intra-articular fracture of distal end of left radius with routine healing, subsequent encounter [S72.218P]   Referral Source:  Robyn Rosa*     Date of Initial Visit:  24 Attended Visits:  8 Missed Visits:  0     SUMMARY OF TREATMENT/ASSESSMENT:  Patient seen for re-assessment on 10/2/24--see below for details.  Patient demonstrates improvement in left hand  strength as well as left wrist ROM.  Patient is able to make a full fist at this time.  Patient has met 3 goals, partially met 2 goals.  Current limitations; pain (midline,  L wrist and forearm); numbness in fingers/back of L hand, limited weight bearing  tolerance on the LUE/hand, decreased Left wrist flexion.  Patient seen 8/12 visits   Tolerating all exercises at this time without use of splint. Recommend continue OT services to address remaining goals     CURRENT STATUS/GOALS     Measurements: Taken with Eliecer Dynamometer, in lbs   Level 2 DATE  24 DATE  10/2/24 DATE   Right 88  n/t     Left 17 40      Norms:male 40-44  Non-dominant hand:  lbs     ROM:                        Active Active/Passive   DATE:   9/4/24 9/4/24 10/2/24          left right left      Forearm Sup / Pronation 73 / 56 75 / 82  P-65     Wrist Flex / Ext 25 / 40 50 / 63  32 / 50       Ulnar/ Radial Dev 15 /  10  20  22 / 15     Finger Flex/ext Partial fist WFL  full  fist             Ampac:    47.98 % (initial;  63.89 %)  Short Term Goals: To be accomplished in 6 treatments.   Patient will be independent with HEP to promote gains between sessions  [x] Met [] Not met [] Partially met  Date: 24  2.  Patient will demonstrate an active L composite fist at -1 to

## 2024-10-04 ENCOUNTER — HOSPITAL ENCOUNTER (OUTPATIENT)
Facility: HOSPITAL | Age: 39
Setting detail: RECURRING SERIES
Discharge: HOME | End: 2024-10-07
Payer: COMMERCIAL

## 2024-10-04 ENCOUNTER — APPOINTMENT (OUTPATIENT)
Facility: HOSPITAL | Age: 39
End: 2024-10-04
Payer: COMMERCIAL

## 2024-10-04 PROCEDURE — 97112 NEUROMUSCULAR REEDUCATION: CPT

## 2024-10-04 PROCEDURE — 97110 THERAPEUTIC EXERCISES: CPT

## 2024-10-04 PROCEDURE — 97530 THERAPEUTIC ACTIVITIES: CPT

## 2024-10-04 NOTE — PROGRESS NOTES
[]redness - adverse reaction:                [x]  Patient Education billed concurrently with other procedures   [x] Review HEP    [] Progressed/Changed HEP, detail:    [x] Other detail: results of pinch re-check; positioning L shoulder, supine (pain management)      Other Objective/Functional Measures    Pinch Measurements: Taken with Pinch Gauge, in lbs     Date  9/4/24 Date  10/4/24    3 pt       Right  20  n/t   Left  10  12.5   Lateral: Right 24 N/t    Left 11 13    Norms: male 40-44 non-dominant hand  3 point: 15-37 lbs  Lateral: 19-31 lbs     Pain Level at end of session (0-10 scale): 3-4; L wrist; 0 pain L shoulder (at rest)      Assessment  Patient demonstrates a two pound increase in left pinch strength (lateral and tip). Patient reported  left shoulder pain following push/pull task following last OT session. Educated patient  on L shoulder positioning when  supine secondary to reported generalized L shoulder/UE  pain. Patient scheduled for PT evaluation on 10/14 re; left shoulder.  Mild edema continues in the left wrist.      Patient will continue to benefit from skilled PT / OT services to modify and progress therapeutic interventions, analyze and address ROM deficits, analyze and address strength deficits, analyze and address soft tissue restrictions, analyze and cue for proper movement patterns, and address sensory deficits  to address functional deficits and attain remaining goals.    Progress toward goals / Updated goals:  []  See Progress Note/Recertification    Ampac:    47.98 % (initial;  63.89 %)  Short Term Goals: To be accomplished in 6 treatments.   Patient will be independent with HEP to promote gains between sessions  [x] Met [] Not met [] Partially met  Date: 9/13/24  2.  Patient will demonstrate an active L composite fist at -1 to  -1.5 CM (eval: - 3 cm) to promote ability to grasp/hold items at work              [x] Met [] Not met [] Partially met  Date: 9/18/24  3.  Patient will

## 2024-10-09 ENCOUNTER — APPOINTMENT (OUTPATIENT)
Facility: HOSPITAL | Age: 39
End: 2024-10-09
Payer: COMMERCIAL

## 2024-10-09 ENCOUNTER — HOSPITAL ENCOUNTER (OUTPATIENT)
Facility: HOSPITAL | Age: 39
Setting detail: RECURRING SERIES
Discharge: HOME | End: 2024-10-12
Payer: COMMERCIAL

## 2024-10-09 PROCEDURE — 97110 THERAPEUTIC EXERCISES: CPT

## 2024-10-09 PROCEDURE — 97530 THERAPEUTIC ACTIVITIES: CPT

## 2024-10-09 NOTE — PROGRESS NOTES
adverse reaction                 []redness - adverse reaction:                [x]  Patient Education billed concurrently with other procedures   [x] Review HEP    [] Progressed/Changed HEP, detail:    [] Other detail:       Other Objective/Functional Measures         Pain Level at end of session (0-10 scale): 3-4, L wrist       Assessment  Patient reports radial wrist pain with ulnar deviation, limited wrist flexion and extension.  Patient states his L arm feels \"heavy\" by the end of the day--L shoulder discomfort with IR, reaching overhead--PT evaluation for the L shoulder is scheduled for 10/14/24. Patient tolerating light to light +  exercises  for the left forearm (rotation) as well as wrist flex/extension,  strengthening      Patient will continue to benefit from skilled PT / OT services to modify and progress therapeutic interventions, analyze and address ROM deficits, analyze and address strength deficits, analyze and address soft tissue restrictions, analyze and cue for proper movement patterns, and address sensory deficits  to address functional deficits and attain remaining goals.    Progress toward goals / Updated goals:  []  See Progress Note/Recertification    [facility]:    47.98 % (initial;  63.89 %)  Short Term Goals: To be accomplished in 6 treatments.   Patient will be independent with HEP to promote gains between sessions  [x] Met [] Not met [] Partially met  Date: 9/13/24  2.  Patient will demonstrate an active L composite fist at -1 to  -1.5 CM (eval: - 3 cm) to promote ability to grasp/hold items at work              [x] Met [] Not met [] Partially met  Date: 9/18/24  3.  Patient will demonstrate left hand  strength of at least 25 lbs in preparation for work tasks requiring  grasping /holding items ie steering wheel, levers on equipment               [x] Met [] Not met [] Partially met  Date: 9/18/24-- 33 lbs  4.  Patient will be mod I with tying footwear (shoes, boats), etc in

## 2024-10-11 ENCOUNTER — HOSPITAL ENCOUNTER (OUTPATIENT)
Facility: HOSPITAL | Age: 39
Setting detail: RECURRING SERIES
Discharge: HOME | End: 2024-10-14
Payer: COMMERCIAL

## 2024-10-11 ENCOUNTER — APPOINTMENT (OUTPATIENT)
Facility: HOSPITAL | Age: 39
End: 2024-10-11
Payer: COMMERCIAL

## 2024-10-11 PROCEDURE — 97110 THERAPEUTIC EXERCISES: CPT

## 2024-10-11 NOTE — PROGRESS NOTES
HEP    [] Progressed/Changed HEP, detail:    [] Other detail:       Other Objective/Functional Measures         Pain Level at end of session (0-10 scale): 0      Assessment  Patient has one more approved  visit.  Upcoming orthopedic follow up visit scheduled for 10/21/24.  Feel patient will benefit from additional OT services to further progress LUE (wrist/forearm) RoM and strength in preparation for return to work duties.       Patient will continue to benefit from skilled PT / OT services to modify and progress therapeutic interventions, analyze and address ROM deficits, analyze and address strength deficits, analyze and address soft tissue restrictions, analyze and cue for proper movement patterns, and address sensory deficits  to address functional deficits and attain remaining goals.    Progress toward goals / Updated goals:  []  See Progress Note/Recertification    Ampac:    47.98 % (initial;  63.89 %)  Short Term Goals: To be accomplished in 6 treatments.   Patient will be independent with HEP to promote gains between sessions  [x] Met [] Not met [] Partially met  Date: 9/13/24  2.  Patient will demonstrate an active L composite fist at -1 to  -1.5 CM (eval: - 3 cm) to promote ability to grasp/hold items at work              [x] Met [] Not met [] Partially met  Date: 9/18/24  3.  Patient will demonstrate left hand  strength of at least 25 lbs in preparation for work tasks requiring  grasping /holding items ie steering wheel, levers on equipment               [x] Met [] Not met [] Partially met  Date: 9/18/24-- 33 lbs  4.  Patient will be mod I with tying footwear (shoes, boats), etc in preparation for return to work               [] Met [] Not met [x] Partially met  Date: 10/2/24--min difficulty/pain     Long Term Goals: To be accomplished in 12 treatments.   Patient will demonstrate a 10 % gain on the Ampac              [] Met [] Not met [] Partially met  Date:   2.  Patient will be mod I with all

## 2024-10-14 ENCOUNTER — HOSPITAL ENCOUNTER (OUTPATIENT)
Facility: HOSPITAL | Age: 39
Setting detail: RECURRING SERIES
Discharge: HOME | End: 2024-10-17
Payer: COMMERCIAL

## 2024-10-14 PROCEDURE — 97161 PT EVAL LOW COMPLEX 20 MIN: CPT

## 2024-10-14 PROCEDURE — 97110 THERAPEUTIC EXERCISES: CPT

## 2024-10-14 NOTE — THERAPY EVALUATION
Michael Nguyen [facility]  [address], [address]  [place] [address]  Phone: [phone]    Fax: [phone]         PHYSICAL THERAPY - MEDICARE EVALUATION/PLAN OF CARE NOTE (updated 3/23)      Date: 10/14/2024          Patient Name:  Tani Diez :  1985   Medical   Diagnosis:  Acute pain of left shoulder [M25.512] Treatment Diagnosis:  M25.512  LEFT SHOULDER PAIN and M25.532  LEFT WRIST PAIN    Referral Source:  Robyn Rosa* Provider #:  [id number]                Insurance: Payor: GENERIC SELF-INSURED / Plan: GENERIC SELF-INSURED WC / Product Type: *No Product type* /      Patient  verified yes     Visit #   Current  / Total 1 12   Time   In / Out 1105 1205   Total Treatment Time 60   Total Timed Codes 24   1:1 Treatment Time 60      [facility] Totals Reminder:  bill using total billable   min of TIMED therapeutic procedures and modalities.   8-22 min = 1 unit; 23-37 min = 2 units; 38-52 min = 3 units;  53-67 min = 4 units; 68-82 min = 5 units           SUBJECTIVE  Pain Level (0-10 scale): 5  []constant []intermittent []improving []worsening []no change since onset    Any medication changes, allergies to medications, adverse drug reactions, diagnosis change, or new procedure performed?: [x] No    [] Yes (see summary sheet for update)  Medications: Verified on Patient Summary List    Subjective functional status/changes:     Wearing splint left wrist; hot, swollen, weak, hurts.  Left shoulder also injured; weak, hurts, poor reaching, lifting, carrying  Not allowed to return to work unless 100%    Start of Care: 10/14/2024  Onset Date: 24  Current symptoms/Complaints: as above  Mechanism of Injury: fell/knocked down  PLOF: wnl  Limitations to PLOF/Activity or Recreational Limitations: na  Work Hx:   Living Situation: wife, 3 children  Mobility: wfl  Self Care: limited use left arm  Previous Treatment/Compliance:

## 2024-10-15 ENCOUNTER — HOSPITAL ENCOUNTER (OUTPATIENT)
Facility: HOSPITAL | Age: 39
Discharge: HOME | End: 2024-10-18
Payer: COMMERCIAL

## 2024-10-15 DIAGNOSIS — M25.512 ACUTE PAIN OF LEFT SHOULDER: ICD-10-CM

## 2024-10-15 DIAGNOSIS — S52.572D OTHER CLOSED INTRA-ARTICULAR FRACTURE OF DISTAL END OF LEFT RADIUS WITH ROUTINE HEALING, SUBSEQUENT ENCOUNTER: ICD-10-CM

## 2024-10-15 PROCEDURE — 73110 X-RAY EXAM OF WRIST: CPT

## 2024-10-15 PROCEDURE — 73030 X-RAY EXAM OF SHOULDER: CPT

## 2024-10-16 ENCOUNTER — APPOINTMENT (OUTPATIENT)
Facility: HOSPITAL | Age: 39
End: 2024-10-16
Payer: COMMERCIAL

## 2024-10-16 ENCOUNTER — HOSPITAL ENCOUNTER (OUTPATIENT)
Facility: HOSPITAL | Age: 39
Setting detail: RECURRING SERIES
Discharge: HOME | End: 2024-10-19
Payer: COMMERCIAL

## 2024-10-16 PROCEDURE — 97110 THERAPEUTIC EXERCISES: CPT

## 2024-10-16 NOTE — PROGRESS NOTES
applicable)    Details if applicable:  retrograde, scar massage       98275 Therapeutic Activity (timed):  use of dynamic activities replicating functional movements to increase ROM, strength, coordination, balance, and proprioception in order to improve patient's ability to progress to PLOF and address remaining functional goals.  (see flow sheet as applicable)    Details if applicable: work simulation tasks; small objects/tool use     35014 Neuromuscular Re-Education (timed):  improve balance, coordination, kinesthetic sense, posture, core stability and proprioception to improve patient's ability to develop conscious control of individual muscles and awareness of position of extremities in order to progress to PLOF and address remaining functional goals. (see flow sheet as applicable)    Details if applicable:  neuro glides           Details if applicable:     43 43    Total Total       Modalities Rationale:     decrease inflammation and decrease pain to improve patient's ability to progress to PLOF and address remaining functional goals.       min [] Estim Unattended,             type/location:       []  w/ice    []  w/heat        min [] Estim Attended,             type/location:       []  w/ice   []  w/heat         []  w/US   []  TENS insruct            min []  Mechanical Traction,        type/lbs:        []  pro      []  sup           []  int       []  cont            []  before manual           []  after manual     min []  Ultrasound,         settings/location:      min  unbilled []  Ice     []  Heat            location/position:         min []  Vasopneumatic Device,      press/temp:   pre-treatment girth :    post-treatment girth :    measured at (landmark       location) :   If using vaso (only need to measure limb vaso being performed on)        min []  Other:        Skin assessment post-treatment (if applicable):    []  intact    []  redness- no adverse reaction                 []redness - adverse

## 2024-10-18 ENCOUNTER — HOSPITAL ENCOUNTER (OUTPATIENT)
Facility: HOSPITAL | Age: 39
Setting detail: RECURRING SERIES
Discharge: HOME | End: 2024-10-21
Payer: COMMERCIAL

## 2024-10-18 PROCEDURE — 97110 THERAPEUTIC EXERCISES: CPT

## 2024-10-18 PROCEDURE — 97140 MANUAL THERAPY 1/> REGIONS: CPT

## 2024-10-18 NOTE — PROGRESS NOTES
PHYSICAL THERAPY - DAILY TREATMENT NOTE (updated 3/23)      Date: 10/18/2024          Patient Name:  Tani Diez :  1985   Medical   Diagnosis:  Acute pain of left shoulder [M25.512] Treatment Diagnosis:  M25.512  LEFT SHOULDER PAIN    Referral Source:  Robyn Rosa* Insurance:   Payor: GENERIC SELF-INSURED / Plan: GENERIC SELF-INSURED WC / Product Type: *No Product type* /                     Patient  verified yes     Visit #   Current  / Total 2 12   Time   In / Out 835 915   Total Treatment Time 40   Total Timed Codes 40         SUBJECTIVE    Pain Level (0-10 scale): 4    Any medication changes, allergies to medications, adverse drug reactions, diagnosis change, or new procedure performed?: [x] No    [] Yes (see summary sheet for update)  Medications: Verified on Patient Summary List    Subjective functional status/changes:     Pain continues in L shoulder and L wrist.  He isn't sleeping well.  Sees MD for f/u on 10/21/24.    OBJECTIVE      Therapeutic Procedures:  Tx Min Billable or 1:1 Min (if diff from Tx Min) Procedure, Rationale, Specifics   25  90385 Therapeutic Exercise (timed):  increase ROM, strength, coordination, balance, and proprioception to improve patient's ability to progress to PLOF and address remaining functional goals. (see flow sheet as applicable)     Details if applicable:     15  58782 Manual Therapy (timed):  decrease pain, increase ROM, increase tissue extensibility, and decrease trigger points to improve patient's ability to progress to PLOF and address remaining functional goals.  The manual therapy interventions were performed at a separate and distinct time from the therapeutic activities interventions . (see flow sheet as applicable)     Details if applicable:  PROM L shoulder all planes, joint mobs                  40     Total Total       [x]  Patient Education billed concurrently with other procedures   [x] Review HEP    [] Progressed/Changed

## 2024-10-21 ENCOUNTER — OFFICE VISIT (OUTPATIENT)
Age: 39
End: 2024-10-21
Payer: COMMERCIAL

## 2024-10-21 VITALS
SYSTOLIC BLOOD PRESSURE: 137 MMHG | WEIGHT: 173 LBS | HEIGHT: 65 IN | BODY MASS INDEX: 28.82 KG/M2 | DIASTOLIC BLOOD PRESSURE: 88 MMHG | HEART RATE: 76 BPM | OXYGEN SATURATION: 96 % | RESPIRATION RATE: 16 BRPM | TEMPERATURE: 99.1 F

## 2024-10-21 DIAGNOSIS — M75.82 ROTATOR CUFF TENDINITIS, LEFT: Primary | ICD-10-CM

## 2024-10-21 DIAGNOSIS — S52.572D OTHER CLOSED INTRA-ARTICULAR FRACTURE OF DISTAL END OF LEFT RADIUS WITH ROUTINE HEALING, SUBSEQUENT ENCOUNTER: ICD-10-CM

## 2024-10-21 PROCEDURE — 99213 OFFICE O/P EST LOW 20 MIN: CPT | Performed by: ORTHOPAEDIC SURGERY

## 2024-10-21 NOTE — PROGRESS NOTES
Mr.Pucheta Diez  is here for a follow up visit after undergoing Left Wrist Open Reduction Internal Fixation - Left on 7/30/2024.    Pain has been appropriate since surgery, no major medical complications since surgery. Patient reports pain is controlled.    The patient denies fevers, chills, nausea, vomiting.   The patient has been following the weight bearing precautions: WBAT LUE in removable wrist brace.  The patient completed  therapy.  Continues to have some limitations in ROM and some numbness over MF.      He has not been back to work due to not being able to accommodate work restrictions.     Current Outpatient Medications on File Prior to Visit   Medication Sig Dispense Refill    acetaminophen (TYLENOL) 500 MG tablet Take 1 tablet by mouth every 6 hours as needed for Pain (Patient not taking: Reported on 10/21/2024) 50 tablet 1    ondansetron (ZOFRAN-ODT) 4 MG disintegrating tablet Place 1 tablet under the tongue every 8 hours as needed for Nausea or Vomiting (Patient not taking: Reported on 10/21/2024) 10 tablet 1    sennosides-docusate sodium (SENOKOT-S) 8.6-50 MG tablet Take 1 tablet by mouth 2 times daily as needed for Constipation (Patient not taking: Reported on 10/21/2024) 30 tablet 0    ibuprofen (ADVIL;MOTRIN) 600 MG tablet Take 1 tablet by mouth 3 times daily as needed for Pain (Patient not taking: Reported on 10/21/2024) 90 tablet 2     No current facility-administered medications on file prior to visit.       ROS:  General: denies agitation, fevers/chills  Cardiac: denies major chest pain  Gastrointestinal: denies nausea/vomiting  Neurologic: Denies numbness/paresthesias  Skin: Denies erythema, warmth to touch, active drainage  Musculoskeletal: Endorses appropriate pain at surgical site      Physical Examination:    Blood pressure 137/88, pulse 76, temperature 99.1 °F (37.3 °C), resp. rate 16, height 1.651 m (5' 5\"), weight 78.5 kg (173 lb), SpO2 96%.    GENERAL: Patient is a healthy-appearing

## 2024-10-21 NOTE — PROGRESS NOTES
Chief Complaint   Patient presents with    Post-Op Check     /88   Pulse 76   Temp 99.1 °F (37.3 °C)   Resp 16   Ht 1.651 m (5' 5\")   Wt 78.5 kg (173 lb)   SpO2 96%   BMI 28.79 kg/m²   1. Have you been to the ER, urgent care clinic since your last visit?  Hospitalized since your last visit?No    2. Have you seen or consulted any other health care providers outside of the [facility] since your last visit?  Include any pap smears or colon screening. No

## 2024-10-23 ENCOUNTER — HOSPITAL ENCOUNTER (OUTPATIENT)
Facility: HOSPITAL | Age: 39
Setting detail: RECURRING SERIES
Discharge: HOME | End: 2024-10-26
Payer: COMMERCIAL

## 2024-10-23 PROCEDURE — 97110 THERAPEUTIC EXERCISES: CPT

## 2024-10-23 NOTE — PROGRESS NOTES
PHYSICAL THERAPY - DAILY TREATMENT NOTE (updated 3/23)      Date: 10/23/2024          Patient Name:  Tani Diez :  1985   Medical   Diagnosis:  Acute pain of left shoulder [M25.512] Treatment Diagnosis:  M25.512  LEFT SHOULDER PAIN    Referral Source:  Robyn Rosa* Insurance:   Payor: GENERIC SELF-INSURED / Plan: GENERIC SELF-INSURED WC / Product Type: *No Product type* /                     Patient  verified yes     Visit #   Current  / Total 3 12   Time   In / Out 1130 1215   Total Treatment Time 45   Total Timed Codes 40         SUBJECTIVE    Pain Level (0-10 scale): 4    Any medication changes, allergies to medications, adverse drug reactions, diagnosis change, or new procedure performed?: [x] No    [] Yes (see summary sheet for update)  Medications: Verified on Patient Summary List    Subjective functional status/changes:       OBJECTIVE      Therapeutic Procedures:  Tx Min Billable or 1:1 Min (if diff from Tx Min) Procedure, Rationale, Specifics   40  19386 Therapeutic Exercise (timed):  increase ROM, strength, coordination, balance, and proprioception to improve patient's ability to progress to PLOF and address remaining functional goals. (see flow sheet as applicable)     Details if applicable:       65147 Manual Therapy (timed):  decrease pain, increase ROM, increase tissue extensibility, and decrease trigger points to improve patient's ability to progress to PLOF and address remaining functional goals.  The manual therapy interventions were performed at a separate and distinct time from the therapeutic activities interventions . (see flow sheet as applicable)     Details if applicable:  PROM L shoulder all planes, joint mobs                  40     Total Total       [x]  Patient Education billed concurrently with other procedures   [x] Review HEP    [] Progressed/Changed HEP, detail:    [] Other detail:         Other Objective/Functional Measures    Pain Level at end of

## 2024-10-30 ENCOUNTER — HOSPITAL ENCOUNTER (OUTPATIENT)
Facility: HOSPITAL | Age: 39
Setting detail: RECURRING SERIES
Discharge: HOME | End: 2024-11-02
Payer: COMMERCIAL

## 2024-10-30 PROCEDURE — 97110 THERAPEUTIC EXERCISES: CPT

## 2024-10-30 NOTE — PROGRESS NOTES
PHYSICAL THERAPY - DAILY TREATMENT NOTE (updated 3/23)      Date: 10/30/2024          Patient Name:  Tani Diez :  1985   Medical   Diagnosis:  Acute pain of left shoulder [M25.512] Treatment Diagnosis:  M25.512  LEFT SHOULDER PAIN    Referral Source:  Robyn Rosa* Insurance:   Payor: GENERIC SELF-INSURED / Plan: GENERIC SELF-INSURED WC / Product Type: *No Product type* /                     Patient  verified yes     Visit #   Current  / Total 4 12   Time   In / Out 215 300   Total Treatment Time 45   Total Timed Codes 40         SUBJECTIVE    Pain Level (0-10 scale): 5    Any medication changes, allergies to medications, adverse drug reactions, diagnosis change, or new procedure performed?: [x] No    [] Yes (see summary sheet for update)  Medications: Verified on Patient Summary List    Subjective functional status/changes:       OBJECTIVE      Therapeutic Procedures:  Tx Min Billable or 1:1 Min (if diff from Tx Min) Procedure, Rationale, Specifics   40  65756 Therapeutic Exercise (timed):  increase ROM, strength, coordination, balance, and proprioception to improve patient's ability to progress to PLOF and address remaining functional goals. (see flow sheet as applicable)     Details if applicable:       50636 Manual Therapy (timed):  decrease pain, increase ROM, increase tissue extensibility, and decrease trigger points to improve patient's ability to progress to PLOF and address remaining functional goals.  The manual therapy interventions were performed at a separate and distinct time from the therapeutic activities interventions . (see flow sheet as applicable)     Details if applicable:  PROM L shoulder all planes, joint mobs                  40     Total Total       [x]  Patient Education billed concurrently with other procedures   [x] Review HEP    [] Progressed/Changed HEP, detail:    [] Other detail:         Other Objective/Functional Measures    Pain Level at end of

## 2024-11-01 ENCOUNTER — TELEPHONE (OUTPATIENT)
Age: 39
End: 2024-11-01

## 2024-11-01 NOTE — TELEPHONE ENCOUNTER
W/C  Rolf called stating that Office visit note needed to be faxed to . Paperwork was faxed on 11/01/24

## 2024-11-06 ENCOUNTER — HOSPITAL ENCOUNTER (OUTPATIENT)
Facility: HOSPITAL | Age: 39
Setting detail: RECURRING SERIES
Discharge: HOME | End: 2024-11-09
Payer: COMMERCIAL

## 2024-11-06 PROCEDURE — 97110 THERAPEUTIC EXERCISES: CPT

## 2024-11-06 NOTE — PROGRESS NOTES
PHYSICAL THERAPY - DAILY TREATMENT NOTE (updated 3/23)      Date: 2024          Patient Name:  Tani Diez :  1985   Medical   Diagnosis:  Acute pain of left shoulder [M25.512] Treatment Diagnosis:  M25.512  LEFT SHOULDER PAIN    Referral Source:  Robyn Rosa* Insurance:   Payor: GENERIC SELF-INSURED / Plan: GENERIC SELF-INSURED WC / Product Type: *No Product type* /                     Patient  verified yes     Visit #   Current  / Total 5 12   Time   In / Out 1140 1225   Total Treatment Time 45   Total Timed Codes 40         SUBJECTIVE    Pain Level (0-10 scale): 4    Any medication changes, allergies to medications, adverse drug reactions, diagnosis change, or new procedure performed?: [x] No    [] Yes (see summary sheet for update)  Medications: Verified on Patient Summary List    Subjective functional status/changes:       OBJECTIVE      Therapeutic Procedures:  Tx Min Billable or 1:1 Min (if diff from Tx Min) Procedure, Rationale, Specifics   40  23357 Therapeutic Exercise (timed):  increase ROM, strength, coordination, balance, and proprioception to improve patient's ability to progress to PLOF and address remaining functional goals. (see flow sheet as applicable)     Details if applicable:       98989 Manual Therapy (timed):  decrease pain, increase ROM, increase tissue extensibility, and decrease trigger points to improve patient's ability to progress to PLOF and address remaining functional goals.  The manual therapy interventions were performed at a separate and distinct time from the therapeutic activities interventions . (see flow sheet as applicable)     Details if applicable:  PROM L shoulder all planes, joint mobs                  40     Total Total       [x]  Patient Education billed concurrently with other procedures   [x] Review HEP    [] Progressed/Changed HEP, detail:    [] Other detail:         Other Objective/Functional Measures    Pain Level at end of

## 2024-11-08 ENCOUNTER — TELEPHONE (OUTPATIENT)
Age: 39
End: 2024-11-08

## 2024-11-08 ENCOUNTER — HOSPITAL ENCOUNTER (OUTPATIENT)
Facility: HOSPITAL | Age: 39
Setting detail: RECURRING SERIES
Discharge: HOME | End: 2024-11-11
Payer: COMMERCIAL

## 2024-11-08 PROCEDURE — 97110 THERAPEUTIC EXERCISES: CPT

## 2024-11-08 PROCEDURE — 97140 MANUAL THERAPY 1/> REGIONS: CPT

## 2024-11-08 NOTE — TELEPHONE ENCOUNTER
Kyle, PT  is requesting a CB to schedule MRI ordered by Dr. Moe JC as he has not heard from anyone regarding scheduling. Kyle can be reached at [phone].

## 2024-11-08 NOTE — PROGRESS NOTES
PHYSICAL THERAPY - DAILY TREATMENT NOTE (updated 3/23)      Date: 2024          Patient Name:  Tani Diez :  1985   Medical   Diagnosis:  Acute pain of left shoulder [M25.512] Treatment Diagnosis:  M25.512  LEFT SHOULDER PAIN    Referral Source:  Robyn Rosa* Insurance:   Payor: GENERIC SELF-INSURED / Plan: GENERIC SELF-INSURED WC / Product Type: *No Product type* /                     Patient  verified yes     Visit #   Current  / Total 6 12   Time   In / Out 1130 1215   Total Treatment Time 45   Total Timed Codes 41         SUBJECTIVE    Pain Level (0-10 scale): 3-4    Any medication changes, allergies to medications, adverse drug reactions, diagnosis change, or new procedure performed?: [x] No    [] Yes (see summary sheet for update)  Medications: Verified on Patient Summary List    Subjective functional status/changes:         OBJECTIVE      Therapeutic Procedures:  Tx Min Billable or 1:1 Min (if diff from Tx Min) Procedure, Rationale, Specifics   31  15044 Therapeutic Exercise (timed):  increase ROM, strength, coordination, balance, and proprioception to improve patient's ability to progress to PLOF and address remaining functional goals. (see flow sheet as applicable)     Details if applicable:     10  69083 Manual Therapy (timed):  decrease pain, increase ROM, increase tissue extensibility, and decrease trigger points to improve patient's ability to progress to PLOF and address remaining functional goals.  The manual therapy interventions were performed at a separate and distinct time from the therapeutic activities interventions . (see flow sheet as applicable)     Details if applicable:  PROM L shoulder all planes, joint mobs                  41     Total Total       [x]  Patient Education billed concurrently with other procedures   [x] Review HEP    [] Progressed/Changed HEP, detail:    [] Other detail:         Other Objective/Functional Measures    Pain Level at

## 2024-11-13 ENCOUNTER — HOSPITAL ENCOUNTER (OUTPATIENT)
Facility: HOSPITAL | Age: 39
Setting detail: RECURRING SERIES
Discharge: HOME | End: 2024-11-16
Payer: COMMERCIAL

## 2024-11-13 PROCEDURE — 97110 THERAPEUTIC EXERCISES: CPT

## 2024-11-13 NOTE — PROGRESS NOTES
PHYSICAL THERAPY - DAILY TREATMENT NOTE (updated 3/23)      Date: 2024          Patient Name:  Tani Diez :  1985   Medical   Diagnosis:  Acute pain of left shoulder [M25.512] Treatment Diagnosis:  M25.512  LEFT SHOULDER PAIN    Referral Source:  Robyn Rosa* Insurance:   Payor: GENERIC SELF-INSURED / Plan: GENERIC SELF-INSURED WC / Product Type: *No Product type* /                     Patient  verified yes     Visit #   Current  / Total 7 12   Time   In / Out 1115 1200   Total Treatment Time 45   Total Timed Codes 43         SUBJECTIVE    Pain Level (0-10 scale): 3-4    Any medication changes, allergies to medications, adverse drug reactions, diagnosis change, or new procedure performed?: [x] No    [] Yes (see summary sheet for update)  Medications: Verified on Patient Summary List    Subjective functional status/changes:   L wrist hurts more than L shoulder. Wrist is still hurting with AROM in both Ext/Flex. L Shoulder hurts most with abd past neutral plum line.      OBJECTIVE      Therapeutic Procedures:  Tx Min Billable or 1:1 Min (if diff from Tx Min) Procedure, Rationale, Specifics   43  39019 Therapeutic Exercise (timed):  increase ROM, strength, coordination, balance, and proprioception to improve patient's ability to progress to PLOF and address remaining functional goals. (see flow sheet as applicable)     Details if applicable:       50341 Manual Therapy (timed):  decrease pain, increase ROM, increase tissue extensibility, and decrease trigger points to improve patient's ability to progress to PLOF and address remaining functional goals.  The manual therapy interventions were performed at a separate and distinct time from the therapeutic activities interventions . (see flow sheet as applicable)     Details if applicable:  PROM L shoulder all planes, joint mobs                  43     Total Total       [x]  Patient Education billed concurrently with other

## 2024-11-15 ENCOUNTER — HOSPITAL ENCOUNTER (OUTPATIENT)
Facility: HOSPITAL | Age: 39
Setting detail: RECURRING SERIES
Discharge: HOME | End: 2024-11-18
Payer: COMMERCIAL

## 2024-11-15 PROCEDURE — 97110 THERAPEUTIC EXERCISES: CPT

## 2024-11-15 NOTE — PROGRESS NOTES
PHYSICAL THERAPY - DAILY TREATMENT NOTE (updated 3/23)      Date: 11/15/2024          Patient Name:  Tani Diez :  1985   Medical   Diagnosis:  Acute pain of left shoulder [M25.512] Treatment Diagnosis:  M25.512  LEFT SHOULDER PAIN    Referral Source:  Robyn Rosa* Insurance:   Payor: GENERIC SELF-INSURED / Plan: GENERIC SELF-INSURED WC / Product Type: *No Product type* /                     Patient  verified yes     Visit #   Current  / Total 8 12   Time   In / Out 10:43 am 11:32 am   Total Treatment Time 49   Total Timed Codes 45         SUBJECTIVE    Pain Level (0-10 scale): 3    Any medication changes, allergies to medications, adverse drug reactions, diagnosis change, or new procedure performed?: [x] No    [] Yes (see summary sheet for update)  Medications: Verified on Patient Summary List    Subjective functional status/changes:   Pt reports he is still having pain when he moves his arm backward and reaches up over his head.      OBJECTIVE      Therapeutic Procedures:  Tx Min Billable or 1:1 Min (if diff from Tx Min) Procedure, Rationale, Specifics   45  69946 Therapeutic Exercise (timed):  increase ROM, strength, coordination, balance, and proprioception to improve patient's ability to progress to PLOF and address remaining functional goals. (see flow sheet as applicable)     Details if applicable:       21772 Manual Therapy (timed):  decrease pain, increase ROM, increase tissue extensibility, and decrease trigger points to improve patient's ability to progress to PLOF and address remaining functional goals.  The manual therapy interventions were performed at a separate and distinct time from the therapeutic activities interventions . (see flow sheet as applicable)     Details if applicable:  PROM L shoulder all planes, joint mobs                  45     Total Total       [x]  Patient Education billed concurrently with other procedures   [x] Review HEP    []

## 2024-11-15 NOTE — PROGRESS NOTES
[facility]  [address], [address]  [place] [address]  Phone: [phone]    Fax: [phone]    PHYSICAL THERAPY PROGRESS NOTE  Patient Name:  Tani Diez :  1985   Treatment/Medical Diagnosis: Acute pain of left shoulder [M25.512]   Referral Source:  Robyn Rosa*     Date of Initial Visit:  10/14/24 Attended Visits:  8 Missed Visits:  0     SUMMARY OF TREATMENT/ASSESSMENT:  Pt is a pleasant 39 y.o. male who has been receiving skilled physical therapy services to address L shoulder pain associated with work-related injury. Pt demonstrates slight improvements with L shoulder PROM, but continues to be limited by pain with reaching overhead into full AROM. Furthermore, pt continues to be limited with shoulder flexion and ER strength limiting ability to lift and carry items at level required for work. Patient will continue to benefit from skilled PT / OT services to modify and progress therapeutic interventions, analyze and address functional mobility deficits, analyze and address ROM deficits, analyze and address strength deficits, analyze and address soft tissue restrictions, and analyze and cue for proper movement patterns to address functional deficits and attain remaining goals.     CURRENT STATUS/GOALS  Short Term Goals: To be accomplished in 12 treatments.  AROM LEFT WRIST AND SHOULDER = UNINVOLVED RIGHT SIDE - Progressing 11/15/24  Long Term Goals: To be accomplished in 12 treatments.  REACH, GRASP, LIFT, CARRY, PUSH/PULL, RETURN TO ADLs AND JOB. - Progressing 11/15/24      RECOMMENDATIONS FOR SKILLED THERAPY  Pt would continue to benefit from skilled physical therapy at 2x/week.        Doretha Hawkins, PT       11/15/2024       11:37 AM    If you have any questions/comments please contact us directly at [phone].   Thank you for allowing us to assist in the care of your patient.

## 2024-11-19 ENCOUNTER — HOSPITAL ENCOUNTER (OUTPATIENT)
Facility: HOSPITAL | Age: 39
Setting detail: RECURRING SERIES
Discharge: HOME | End: 2024-11-22
Payer: COMMERCIAL

## 2024-11-19 PROCEDURE — 97140 MANUAL THERAPY 1/> REGIONS: CPT

## 2024-11-19 PROCEDURE — 97110 THERAPEUTIC EXERCISES: CPT

## 2024-11-19 NOTE — PROGRESS NOTES
PHYSICAL THERAPY - DAILY TREATMENT NOTE (updated 3/23)      Date: 2024          Patient Name:  Tani Diez :  1985   Medical   Diagnosis:  Acute pain of left shoulder [M25.512] Treatment Diagnosis:  M25.512  LEFT SHOULDER PAIN    Referral Source:  Robyn Rosa* Insurance:   Payor: GENERIC SELF-INSURED / Plan: GENERIC SELF-INSURED WC / Product Type: *No Product type* /                     Patient  verified yes     Visit #   Current  / Total 9 12   Time   In / Out 9:55 am 10:35 am   Total Treatment Time 40   Total Timed Codes 40         SUBJECTIVE    Pain Level (0-10 scale): 3    Any medication changes, allergies to medications, adverse drug reactions, diagnosis change, or new procedure performed?: [x] No    [] Yes (see summary sheet for update)  Medications: Verified on Patient Summary List    Subjective functional status/changes:   Pt reports he is still having pain when he moves his arm backward and reaches up over his head.      OBJECTIVE      Therapeutic Procedures:  Tx Min Billable or 1:1 Min (if diff from Tx Min) Procedure, Rationale, Specifics   40  35394 Therapeutic Exercise (timed):  increase ROM, strength, coordination, balance, and proprioception to improve patient's ability to progress to PLOF and address remaining functional goals. (see flow sheet as applicable)     Details if applicable:       46815 Manual Therapy (timed):  decrease pain, increase ROM, increase tissue extensibility, and decrease trigger points to improve patient's ability to progress to PLOF and address remaining functional goals.  The manual therapy interventions were performed at a separate and distinct time from the therapeutic activities interventions . (see flow sheet as applicable)     Details if applicable:  PROM L shoulder all planes, joint mobs                  40     Total Total       [x]  Patient Education billed concurrently with other procedures   [x] Review HEP    []

## 2024-11-20 ENCOUNTER — HOSPITAL ENCOUNTER (OUTPATIENT)
Facility: HOSPITAL | Age: 39
Discharge: HOME | End: 2024-11-23
Attending: ORTHOPAEDIC SURGERY
Payer: COMMERCIAL

## 2024-11-20 DIAGNOSIS — M75.82 TENDINITIS OF LEFT ROTATOR CUFF: ICD-10-CM

## 2024-11-20 PROCEDURE — 73221 MRI JOINT UPR EXTREM W/O DYE: CPT

## 2024-11-21 ENCOUNTER — HOSPITAL ENCOUNTER (OUTPATIENT)
Facility: HOSPITAL | Age: 39
Setting detail: RECURRING SERIES
Discharge: HOME | End: 2024-11-24
Payer: COMMERCIAL

## 2024-11-21 PROCEDURE — 97110 THERAPEUTIC EXERCISES: CPT

## 2024-11-21 PROCEDURE — 97140 MANUAL THERAPY 1/> REGIONS: CPT

## 2024-11-21 NOTE — PROGRESS NOTES
Other detail:         Other Objective/Functional Measures                  AROM          PROM     Strength    Shoulder      Left     Right       Left     Left      Right     Flexion     156 deg     175 deg     160 deg     5-/5     5/5     Abduction     150 deg     175 deg     163 deg     5/5     5/5     Extension     60 deg     60 deg      NT     5/5     5/5     Internal Rotation     56 deg     56 deg     56 deg     5/5     5/5     External Rotation     48 deg      82 deg    105 deg     5-/5     5/5     Hurts to raise left arm overhead and to reach     11/13/24 - Hand  105 lb right, 75 lb left.      Pain Level at end of session (0-10 scale): 3      Assessment   Pt demonstrated tolerance for TE today only c/o wrist discomfort but manageable to complete activities. Pt tolerated progression of new TE with no adverse reactions. Patient will continue to benefit from skilled PT / OT services to modify and progress therapeutic interventions, analyze and address functional mobility deficits, analyze and address ROM deficits, analyze and address strength deficits, analyze and address soft tissue restrictions, and analyze and cue for proper movement patterns to address functional deficits and attain remaining goals.    Progress toward goals / Updated goals:  []  See Progress Note/Recertification    Short Term Goals: To be accomplished in 12 treatments.  AROM LEFT WRIST AND SHOULDER = UNINVOLVED RIGHT SIDE - Progressing 11/15/24  Long Term Goals: To be accomplished in 12 treatments.  REACH, GRASP, LIFT, CARRY, PUSH/PULL, RETURN TO ADLs AND JOB. - Progressing 11/15/24     Frequency / Duration: Patient to be seen 2 times per week for 12 treatments      PLAN  Yes  Continue plan of care  Re-Cert Due: 1/12/25  [x]  Upgrade activities as tolerated  []  Discharge due to:  []  Other:      LESLI BUTLER PTA       11/21/2024       9:56 AM

## 2024-11-25 ENCOUNTER — HOSPITAL ENCOUNTER (OUTPATIENT)
Facility: HOSPITAL | Age: 39
Setting detail: RECURRING SERIES
Discharge: HOME | End: 2024-11-28
Payer: COMMERCIAL

## 2024-11-25 PROCEDURE — 97110 THERAPEUTIC EXERCISES: CPT

## 2024-11-27 ENCOUNTER — HOSPITAL ENCOUNTER (OUTPATIENT)
Facility: HOSPITAL | Age: 39
Setting detail: RECURRING SERIES
Discharge: HOME | End: 2024-11-30
Payer: COMMERCIAL

## 2024-11-27 PROCEDURE — 97110 THERAPEUTIC EXERCISES: CPT

## 2024-11-27 NOTE — PROGRESS NOTES
PHYSICAL THERAPY - DAILY TREATMENT NOTE (updated 3/23)      Date: 2024          Patient Name:  Tani Diez :  1985   Medical   Diagnosis:  Acute pain of left shoulder [M25.512] Treatment Diagnosis:  M25.512  LEFT SHOULDER PAIN    Referral Source:  Robyn Rosa* Insurance:   Payor: GENERIC SELF-INSURED / Plan: GENERIC SELF-INSURED WC / Product Type: *No Product type* /                     Patient  verified yes     Visit #   Current  / Total 12 12   Time   In / Out 1130 1215   Total Treatment Time 45   Total Timed Codes 45         SUBJECTIVE    Pain Level (0-10 scale): 3    Any medication changes, allergies to medications, adverse drug reactions, diagnosis change, or new procedure performed?: [x] No    [] Yes (see summary sheet for update)  Medications: Verified on Patient Summary List    Subjective functional status/changes:           OBJECTIVE      Therapeutic Procedures:  Tx Min Billable or 1:1 Min (if diff from Tx Min) Procedure, Rationale, Specifics   45  05696 Therapeutic Exercise (timed):  increase ROM, strength, coordination, balance, and proprioception to improve patient's ability to progress to PLOF and address remaining functional goals. (see flow sheet as applicable)     Details if applicable:       18605 Manual Therapy (timed):  decrease pain, increase ROM, increase tissue extensibility, and decrease trigger points to improve patient's ability to progress to PLOF and address remaining functional goals.  The manual therapy interventions were performed at a separate and distinct time from the therapeutic activities interventions . (see flow sheet as applicable)     Details if applicable:  STM to L wrist w/ IFC to L Shd for pain relief.                   45     Total Total       [x]  Patient Education billed concurrently with other procedures   [x] Review HEP    [] Progressed/Changed HEP, detail:    [] Other detail:         Other Objective/Functional Measures

## 2024-11-27 NOTE — PROGRESS NOTES
[facility]  [address], [address]  [place] [address]  Phone: [phone]    Fax: [phone]    PHYSICAL THERAPY PROGRESS NOTE  Patient Name:  Tani Diez :  1985   Treatment/Medical Diagnosis: Acute pain of left shoulder [M25.512]   Referral Source:  Robyn Rosa*     Date of Initial Visit:  10/14/24 Attended Visits:  12 Missed Visits:       SUMMARY OF TREATMENT/ASSESSMENT  /  CURRENT STATUS/GOALS    He has completed 12 visits of PT and 12 visits of OT.  He is 4 months s/p ORIF.    Still c/o pain with use.   is 65% of uninvolved side.  Has full left shoulder rom now but with pain at end ranges.  Left wrist rom remains limited especially flexion.  He works out well with weights in PT but weights are light.    These goals are not achieved;   Short Term Goals: To be accomplished in 12 treatments.  AROM LEFT WRIST AND SHOULDER = UNINVOLVED RIGHT SIDE -   Long Term Goals: To be accomplished in 12 treatments.  REACH, GRASP, LIFT, CARRY, PUSH/PULL, RETURN TO ADLs AND JOB.     RECOMMENDATIONS FOR SKILLED THERAPY  Referred patient back to Ortho. For A/P.        Michael Hebert, PT       2024       12:01 PM    If you have any questions/comments please contact us directly at [phone].   Thank you for allowing us to assist in the care of your patient.

## 2024-12-02 ENCOUNTER — OFFICE VISIT (OUTPATIENT)
Age: 39
End: 2024-12-02

## 2024-12-02 VITALS
TEMPERATURE: 99.4 F | OXYGEN SATURATION: 95 % | HEART RATE: 93 BPM | BODY MASS INDEX: 28.82 KG/M2 | SYSTOLIC BLOOD PRESSURE: 131 MMHG | HEIGHT: 65 IN | RESPIRATION RATE: 17 BRPM | DIASTOLIC BLOOD PRESSURE: 88 MMHG | WEIGHT: 173 LBS

## 2024-12-02 DIAGNOSIS — M75.22 BICIPITAL TENDINITIS OF LEFT SHOULDER: ICD-10-CM

## 2024-12-02 DIAGNOSIS — S46.012D TRAUMATIC INCOMPLETE TEAR OF LEFT ROTATOR CUFF, SUBSEQUENT ENCOUNTER: ICD-10-CM

## 2024-12-02 DIAGNOSIS — M75.22 BICEPS TENDINITIS OF LEFT UPPER EXTREMITY: ICD-10-CM

## 2024-12-02 DIAGNOSIS — S52.572D OTHER CLOSED INTRA-ARTICULAR FRACTURE OF DISTAL END OF LEFT RADIUS WITH ROUTINE HEALING, SUBSEQUENT ENCOUNTER: Primary | ICD-10-CM

## 2024-12-02 ASSESSMENT — PATIENT HEALTH QUESTIONNAIRE - PHQ9
1. LITTLE INTEREST OR PLEASURE IN DOING THINGS: NOT AT ALL
2. FEELING DOWN, DEPRESSED OR HOPELESS: NOT AT ALL
SUM OF ALL RESPONSES TO PHQ QUESTIONS 1-9: 0
SUM OF ALL RESPONSES TO PHQ9 QUESTIONS 1 & 2: 0
SUM OF ALL RESPONSES TO PHQ QUESTIONS 1-9: 0

## 2024-12-02 NOTE — PROGRESS NOTES
Orthopedic History and Physical    Subjective:     Mr.Pucheta Diez  is here for a follow up visit after undergoing Left Wrist Open Reduction Internal Fixation - Left on 7/30/2024.    Pain has been appropriate since surgery, no major medical complications since surgery. Patient reports pain is controlled.    The patient denies fevers, chills, nausea, vomiting.   The patient has been following the weight bearing precautions: WBAT LUE.  The patient was no able to get back into hand therapy yet.  Continues to have some limitations in ROM    He has not been back to work due to not being able to accommodate work restrictions.     He is also following up on left shoulder MRI which she injured during the same fall at work on July 24, 2024 when he fell off the bed of a trailer.  He had pain initially but it was thought that it might improve with nonoperative treatment and physical therapy.  He continued to have pain and some weakness so an MRI was ordered to rule out a rotator cuff tendon tear.  The patient reports continued pain that is worse with abduction and at nighttime.  He reports some weakness.  He has been doing physical therapy for his shoulder without significant improvement.    History reviewed. No pertinent past medical history.   Past Surgical History:   Procedure Laterality Date    WRIST SURGERY Left 7/30/2024    LEFT WRIST OPEN REDUCTION INTERNAL FIXATION performed by Robyn Rosa MD at [facility] MAIN OR     History reviewed. No pertinent family history.   Social History     Tobacco Use    Smoking status: Never     Passive exposure: Never    Smokeless tobacco: Never   Substance Use Topics    Alcohol use: Never       Prior to Admission medications    Medication Sig Start Date End Date Taking? Authorizing Provider   acetaminophen (TYLENOL) 500 MG tablet Take 1 tablet by mouth every 6 hours as needed for Pain  Patient not taking: Reported on 10/21/2024 7/30/24   Robyn Rosa MD

## 2024-12-02 NOTE — H&P (VIEW-ONLY)
partial-thickness supraspinatus tendon tear and biceps tendinitis with the patient and treatment options.  Discussed nonoperative treatment which patient has already exhausted with rest, activity modification, ice, anti-inflammatories, physical therapy.  Discussed that because it looks like the tear involves over 50% of the tendon on the bursal side I would recommend surgery, if it was less than 50% of the tendon there is a chance that he could improve with nonoperative treatment.  The patient's pain and weakness is significantly affecting their activities of daily living.  Discussed operative treatment option would involve left shoulder arthroscopy with rotator cuff repair, debridement, and open biceps tenodesis.  Discussed that this would be an outpatient procedure performed under general anesthesia with a peripheral nerve block.  Discussed that they would be in a sling for 6 weeks postoperatively with limited range of motion and after 6 weeks theywould begin gentle range of motion, they would begin gradual strengthening at 3 months postop and would require extensive physical therapy and rehabilitation for 6 months postop.  For postoperative pain the patient will be given oxycodone, ibuprofen, tylenol,  stool softener, Zofran.    The risks, benefits and alternatives of the procedure were discussed with the patient including the risk of infection, bleeding, damage to surrounding nerves, tendons, blood vessels, need for further procedures, stiffness, chronic pain, wound healing issues, biceps deformity and cramping, inadequate healing of rotator cuff repair or retear.   The patient will be scheduled for surgery on 12/31.  All questions were answered.       ICD-10-CM    1. Other closed intra-articular fracture of distal end of left radius with routine healing, subsequent encounter  S52.572D External Referral To Occupational Therapy      2. Traumatic incomplete tear of left rotator cuff, subsequent encounter  S46.012D

## 2024-12-02 NOTE — PROGRESS NOTES
Identified pt with two pt identifiers(name and ). Reviewed record in preparation for visit and have obtained necessary documentation. All patient medications has been reviewed.  Chief Complaint   Patient presents with    FOLLOW UP SHOULDER      Left         Vitals:    24 0940   BP: 131/88   Pulse: 93   Resp: 17   Temp: 99.4 °F (37.4 °C)   SpO2: 95%                   Coordination of Care Questionnaire:   1) Have you been to an emergency room, urgent care, or hospitalized since your last visit?   No       2. Have seen or consulted any other health care provider since your last visit? No        Patient is accompanied by  I have received verbal consent from Tani Diez to discuss any/all medical information while they are present in the room.

## 2024-12-05 PROBLEM — S46.012A TRAUMATIC INCOMPLETE TEAR OF LEFT ROTATOR CUFF: Status: ACTIVE | Noted: 2024-12-02

## 2024-12-05 PROBLEM — M75.22 BICIPITAL TENDINITIS OF LEFT SHOULDER: Status: ACTIVE | Noted: 2024-12-02

## 2024-12-05 PROBLEM — S52.502D CLOSED FRACTURE OF LOWER END OF LEFT RADIUS WITH ROUTINE HEALING: Status: ACTIVE | Noted: 2024-07-26

## 2024-12-05 RX ORDER — ACETAMINOPHEN 325 MG/1
1000 TABLET ORAL ONCE
OUTPATIENT
Start: 2024-12-05 | End: 2024-12-05

## 2024-12-05 RX ORDER — SODIUM CHLORIDE 9 MG/ML
INJECTION, SOLUTION INTRAVENOUS PRN
OUTPATIENT
Start: 2024-12-05

## 2024-12-05 RX ORDER — SODIUM CHLORIDE 0.9 % (FLUSH) 0.9 %
5-40 SYRINGE (ML) INJECTION EVERY 12 HOURS SCHEDULED
OUTPATIENT
Start: 2024-12-05

## 2024-12-05 RX ORDER — SODIUM CHLORIDE 0.9 % (FLUSH) 0.9 %
5-40 SYRINGE (ML) INJECTION PRN
OUTPATIENT
Start: 2024-12-05

## 2024-12-05 RX ORDER — CELECOXIB 200 MG/1
200 CAPSULE ORAL ONCE
OUTPATIENT
Start: 2024-12-05 | End: 2024-12-05

## 2024-12-13 ENCOUNTER — TELEPHONE (OUTPATIENT)
Age: 39
End: 2024-12-13

## 2024-12-16 ENCOUNTER — HOSPITAL ENCOUNTER (OUTPATIENT)
Facility: HOSPITAL | Age: 39
Setting detail: RECURRING SERIES
Discharge: HOME | End: 2024-12-19
Attending: ORTHOPAEDIC SURGERY
Payer: COMMERCIAL

## 2024-12-16 PROCEDURE — 97110 THERAPEUTIC EXERCISES: CPT

## 2024-12-16 PROCEDURE — 97165 OT EVAL LOW COMPLEX 30 MIN: CPT

## 2024-12-16 NOTE — THERAPY EVALUATION
lifting items with forearm supinated   Mechanism of Injury: fall at wok  PLOF: independent  Limitations to PLOF/Activity or Recreational Limitations: difficulty/pain when lifting/holding/carrying items; pain when grasping items with L hand  Work Hx: not currently working/  Living Situation: lives with family  Mobility: independent  Self Care: independent  Previous Treatment/Compliance: OT & PT (outpatient)  PMHx/Surgical Hx/Comorbidites: n/a  Prior Hospitalization:  n/a  Barriers: [x]pain []Financial []time []transportation []Other:  Substance use: []Alcohol []Tobacco []other:   Pt Goals: \"less pain in the left hand and wrist\"  Motivation: very good  Cognition: A & O x 4          OBJECTIVE    Upper Extremity Assessment:    Hand Dominance: right    Opposition:    Left: intact     Measurements: Taken with Eliecer Dynamometer, in lbs   Level 2 DATE  12/16/24 DATE DATE   Right 105     Left 73     Norms: male 40-44, non-dominant   94 - 130 lbs    Pinch Measurements: Taken with Pinch Gauge, in lbs    Date  12/16/24 Date    3 pt       Right  17    Left  15    Lateral: Right 16    Left 16    Tip:       Right 12.5    Left 13    Norms: male 40-44,  non-dominant  3 point: 15-37 lbs  Lateral; 19-31 lbs  Tip: 12-25 lbs    Fine Motor:   9 HOLE PEG TEST: (in seconds)     DATE  12/16/24   DATE     Left 36      Norms, male 41-45  Average: 16-24  seconds`    SENSATION;  Occasional tingling noted ; L hand, 2nd dorsal web space    ROM:       Active Active/Passive   DATE:  12/16/24 12/16/24       left right     Forearm Sup / Pronation 78 / 63   P: 82     Wrist Flex / Ext 30 / 52 60 / 65      Ulnar/ Radial Dev 24 / 9 31 / 20     Finger Flex/ext WFL WFL           Objective/Functional Outcome Measure:   AM-PAC: 47.98%  AM-PAC score = an established functional score where 0% = no disability       30 min [x]Eval - untimed                        Therapeutic Procedures:  Tx Min Billable or 1:1 Min (if diff from Tx Min)

## 2024-12-17 ENCOUNTER — APPOINTMENT (OUTPATIENT)
Facility: HOSPITAL | Age: 39
End: 2024-12-17
Payer: COMMERCIAL

## 2024-12-17 ENCOUNTER — TELEPHONE (OUTPATIENT)
Age: 39
End: 2024-12-17

## 2024-12-17 NOTE — TELEPHONE ENCOUNTER
Called patient's  Kyle Boyd and confirmed prior authorization for patient's upcoming surgery with Dr. Rosa on 12/31/24 for LEFT SHOULDER ARTHROSCOPY WITH ROTATOR CUFF REPAIR, DEBRIDEMENT, OPEN BICEPS TENODESIS and ensure that the  was aware of the surgery date plus date of the 2 week post op appointment.

## 2024-12-20 ENCOUNTER — HOSPITAL ENCOUNTER (OUTPATIENT)
Facility: HOSPITAL | Age: 39
Setting detail: RECURRING SERIES
Discharge: HOME | End: 2024-12-23
Attending: ORTHOPAEDIC SURGERY
Payer: COMMERCIAL

## 2024-12-20 ENCOUNTER — HOSPITAL ENCOUNTER (OUTPATIENT)
Facility: HOSPITAL | Age: 39
Setting detail: RECURRING SERIES
End: 2024-12-20
Attending: ORTHOPAEDIC SURGERY
Payer: COMMERCIAL

## 2024-12-20 PROCEDURE — 97110 THERAPEUTIC EXERCISES: CPT

## 2024-12-20 NOTE — PROGRESS NOTES
4 blue bands; 15 X 2    Pain Level at end of session (0-10 scale): 3-L wrist, 5 L shoulderr      Assessment   Patient notes radial sided wrist pain with flexion/grasping of objects.  Patient's L shoulder surgery is scheduled for 12/31/24  Patient will continue to benefit from skilled PT / OT services to modify and progress therapeutic interventions, analyze and address ROM deficits, analyze and address strength deficits, and analyze and address soft tissue restrictions to address functional deficits and attain remaining goals.    Progress toward goals / Updated goals:  []  See Progress Note/Recertification    Ampac: 47.98 %  Short Term Goals: To be accomplished in 4-5 treatments.   Patient will be independent with HeP to promote gains between sessions  [] Met [] Not met [x] Partially met  Date: 12/20/24  2.  Patient will demonstrate a 10 - 15 % gain in L hand  strength              [] Met [] Not met [] Partially met  Date:   3.  Patient will demonstrate a 20-25 % improvement in left hand dexterity skills (9 hole peg test) to promote  independence using controls, tools, etc at work              [] Met [] Not met [] Partially met  Date:      Long Term Goals: To be accomplished in 8 treatments.   Patient will demonstrate a 10 % gain on the Ampac  [] Met [] Not met [] Partially met  Date:   2.  Patient will demonstrate L hand  strength that is WFL's for his age group in preparation for return to work duties              [] Met [] Not met [] Partially met  Date:   3. Patient will demonstrate left hand dexterity/FMC (per 9 hole peg) that is WFL's for his age group in preparation for return to work duties              [] Met [] Not met [] Partially met  Date:       PLAN  Yes  Continue plan of care  Re-Cert Due: n/a  [x]  Upgrade activities as tolerated  []  Discharge due to:  []  Other:      Leslye Quiros OT       12/20/2024       1:51 PM

## 2024-12-24 ENCOUNTER — HOSPITAL ENCOUNTER (OUTPATIENT)
Facility: HOSPITAL | Age: 39
Setting detail: RECURRING SERIES
Discharge: HOME | End: 2024-12-27
Attending: ORTHOPAEDIC SURGERY
Payer: COMMERCIAL

## 2024-12-24 PROCEDURE — 97110 THERAPEUTIC EXERCISES: CPT

## 2024-12-24 NOTE — PROGRESS NOTES
OCCUPATIONAL THERAPY - DAILY TREATMENT NOTE (updated 3/23)      Date: 2024          Patient Name:  Tani Diez :  1985   Medical   Diagnosis:  Other closed intra-articular fracture of distal end of left radius with routine healing, subsequent encounter [O59.182N] Treatment Diagnosis:  M25.532  LEFT WRIST PAIN    Referral Source:  Robyn Rosa* Insurance:   Payor: GENERIC SELF-INSURED / Plan: GENERIC SELF-INSURED WC / Product Type: *No Product type* /                     Patient  verified yes     Visit #   Current  / Total 3 8   Time   In / Out 1135 1218   Total Treatment Time 43   Total Timed Codes 43         SUBJECTIVE    Pain Level (0-10 scale): 3-L wrist    Any medication changes, allergies to medications, adverse drug reactions, diagnosis change, or new procedure performed?: [x] No    [] Yes (see summary sheet for update)  Medications: Verified on Patient Summary List    Subjective functional status/changes:     --patient notes pain at L thumb CMC joint with movement    OBJECTIVE      Therapeutic Procedures:  Tx Min Billable or 1:1 Min (if diff from Tx Min) Procedure, Rationale, Specifics   43 43 71049 Therapeutic Exercise (timed):  increase ROM, strength, coordination, balance, and proprioception to improve patient's ability to progress to PLOF and address remaining functional goals. (see flow sheet as applicable)    Details if applicable:  LUE/wrist/hand RoM, strength         Details if applicable:           Details if applicable:           Details if applicable:           Details if applicable:     43 43    Total Total       [x]  Patient Education billed concurrently with other procedures   [] Review HEP    [x] Progressed/Changed HEP, detail:  L thumb CMC exercises  [] Other detail:         Other Objective/Functional Measures  --UBE X 5 min, 2.0  --rotator: 1 min X 2; light, x2 mod /light  --rickshaw, 5 lb /side;  2 X 15  --Hand helper 4 blue bands; 15 X 3  R  thumb web

## 2024-12-30 ENCOUNTER — HOSPITAL ENCOUNTER (OUTPATIENT)
Facility: HOSPITAL | Age: 39
Setting detail: RECURRING SERIES
Discharge: HOME | End: 2025-01-02
Attending: ORTHOPAEDIC SURGERY
Payer: COMMERCIAL

## 2024-12-30 PROCEDURE — 97110 THERAPEUTIC EXERCISES: CPT

## 2024-12-30 NOTE — PROGRESS NOTES
OCCUPATIONAL THERAPY - DAILY TREATMENT NOTE (updated 3/23)      Date: 2024          Patient Name:  Tani Diez :  1985   Medical   Diagnosis:  Other closed intra-articular fracture of distal end of left radius with routine healing, subsequent encounter [H26.269I] Treatment Diagnosis:  M25.532  LEFT WRIST PAIN    Referral Source:  Robyn Rosa* Insurance:   Payor: GENERIC SELF-INSURED / Plan: GENERIC SELF-INSURED WC / Product Type: *No Product type* /                     Patient  verified yes     Visit #   Current  / Total 4 8   Time   In / Out 820 904   Total Treatment Time 44   Total Timed Codes 44         SUBJECTIVE    Pain Level (0-10 scale): 0-L wrist (at rest)    Any medication changes, allergies to medications, adverse drug reactions, diagnosis change, or new procedure performed?: [x] No    [] Yes (see summary sheet for update)  Medications: Verified on Patient Summary List    Subjective functional status/changes:     --L wrist pain (radial side) reported with  flexion/extension of the wrist    OBJECTIVE      Therapeutic Procedures:  Tx Min Billable or 1:1 Min (if diff from Tx Min) Procedure, Rationale, Specifics   44 44 64377 Therapeutic Exercise (timed):  increase ROM, strength, coordination, balance, and proprioception to improve patient's ability to progress to PLOF and address remaining functional goals. (see flow sheet as applicable)    Details if applicable:  LUE/wrist/hand RoM, strength         Details if applicable:           Details if applicable:           Details if applicable:           Details if applicable:     44 44    Total Total       [x]  Patient Education billed concurrently with other procedures   [] Review HEP    [] Progressed/Changed HEP, detail:   [x] Other detail:   general wrist anatomy; results of  strength recheck      Other Objective/Functional Measures     Measurements: Taken with Eliecer Dynamometer, in lbs   Level 2 DATE  24

## 2024-12-31 ENCOUNTER — ANESTHESIA EVENT (OUTPATIENT)
Facility: HOSPITAL | Age: 39
End: 2024-12-31
Payer: COMMERCIAL

## 2024-12-31 ENCOUNTER — ANESTHESIA (OUTPATIENT)
Facility: HOSPITAL | Age: 39
End: 2024-12-31
Payer: COMMERCIAL

## 2024-12-31 ENCOUNTER — HOSPITAL ENCOUNTER (OUTPATIENT)
Facility: HOSPITAL | Age: 39
Setting detail: OUTPATIENT SURGERY
Discharge: HOME | End: 2024-12-31
Attending: ORTHOPAEDIC SURGERY | Admitting: ORTHOPAEDIC SURGERY
Payer: COMMERCIAL

## 2024-12-31 VITALS
HEART RATE: 88 BPM | DIASTOLIC BLOOD PRESSURE: 84 MMHG | OXYGEN SATURATION: 95 % | TEMPERATURE: 98.3 F | HEIGHT: 62 IN | RESPIRATION RATE: 16 BRPM | SYSTOLIC BLOOD PRESSURE: 142 MMHG | WEIGHT: 168 LBS | BODY MASS INDEX: 30.91 KG/M2

## 2024-12-31 DIAGNOSIS — S46.012D TRAUMATIC INCOMPLETE TEAR OF LEFT ROTATOR CUFF, SUBSEQUENT ENCOUNTER: Primary | ICD-10-CM

## 2024-12-31 PROCEDURE — 64415 NJX AA&/STRD BRCH PLXS IMG: CPT | Performed by: ANESTHESIOLOGY

## 2024-12-31 PROCEDURE — 3700000001 HC ADD 15 MINUTES (ANESTHESIA): Performed by: ORTHOPAEDIC SURGERY

## 2024-12-31 PROCEDURE — 6360000002 HC RX W HCPCS: Performed by: ORTHOPAEDIC SURGERY

## 2024-12-31 PROCEDURE — 2500000003 HC RX 250 WO HCPCS: Performed by: NURSE ANESTHETIST, CERTIFIED REGISTERED

## 2024-12-31 PROCEDURE — 6370000000 HC RX 637 (ALT 250 FOR IP): Performed by: ANESTHESIOLOGY

## 2024-12-31 PROCEDURE — 2580000003 HC RX 258: Performed by: NURSE ANESTHETIST, CERTIFIED REGISTERED

## 2024-12-31 PROCEDURE — 3600000014 HC SURGERY LEVEL 4 ADDTL 15MIN: Performed by: ORTHOPAEDIC SURGERY

## 2024-12-31 PROCEDURE — 2709999900 HC NON-CHARGEABLE SUPPLY: Performed by: ORTHOPAEDIC SURGERY

## 2024-12-31 PROCEDURE — 7100000001 HC PACU RECOVERY - ADDTL 15 MIN: Performed by: ORTHOPAEDIC SURGERY

## 2024-12-31 PROCEDURE — 7100000000 HC PACU RECOVERY - FIRST 15 MIN: Performed by: ORTHOPAEDIC SURGERY

## 2024-12-31 PROCEDURE — 6360000002 HC RX W HCPCS: Performed by: ANESTHESIOLOGY

## 2024-12-31 PROCEDURE — L3660 SO 8 AB RSTR CAN/WEB PRE OTS: HCPCS | Performed by: ORTHOPAEDIC SURGERY

## 2024-12-31 PROCEDURE — 3600000004 HC SURGERY LEVEL 4 BASE: Performed by: ORTHOPAEDIC SURGERY

## 2024-12-31 PROCEDURE — 6370000000 HC RX 637 (ALT 250 FOR IP): Performed by: ORTHOPAEDIC SURGERY

## 2024-12-31 PROCEDURE — 6360000002 HC RX W HCPCS: Performed by: NURSE ANESTHETIST, CERTIFIED REGISTERED

## 2024-12-31 PROCEDURE — 7100000011 HC PHASE II RECOVERY - ADDTL 15 MIN: Performed by: ORTHOPAEDIC SURGERY

## 2024-12-31 PROCEDURE — 2500000003 HC RX 250 WO HCPCS: Performed by: ORTHOPAEDIC SURGERY

## 2024-12-31 PROCEDURE — C1713 ANCHOR/SCREW BN/BN,TIS/BN: HCPCS | Performed by: ORTHOPAEDIC SURGERY

## 2024-12-31 PROCEDURE — 2720000010 HC SURG SUPPLY STERILE: Performed by: ORTHOPAEDIC SURGERY

## 2024-12-31 PROCEDURE — 3700000000 HC ANESTHESIA ATTENDED CARE: Performed by: ORTHOPAEDIC SURGERY

## 2024-12-31 PROCEDURE — 2580000003 HC RX 258: Performed by: ORTHOPAEDIC SURGERY

## 2024-12-31 PROCEDURE — 7100000010 HC PHASE II RECOVERY - FIRST 15 MIN: Performed by: ORTHOPAEDIC SURGERY

## 2024-12-31 DEVICE — KNOTLESS TENSIONABLE FIBERTAK BICEPS KIT
Type: IMPLANTABLE DEVICE | Site: SHOULDER | Status: FUNCTIONAL
Brand: ARTHREX®

## 2024-12-31 DEVICE — 4.75MM PEEK KNOTLESS SWIVELOCK W/ TAPE
Type: IMPLANTABLE DEVICE | Site: SHOULDER | Status: FUNCTIONAL
Brand: ARTHREX®

## 2024-12-31 RX ORDER — SODIUM CHLORIDE 9 MG/ML
INJECTION, SOLUTION INTRAVENOUS PRN
Status: DISCONTINUED | OUTPATIENT
Start: 2024-12-31 | End: 2024-12-31 | Stop reason: HOSPADM

## 2024-12-31 RX ORDER — FENTANYL CITRATE 0.05 MG/ML
50 INJECTION, SOLUTION INTRAMUSCULAR; INTRAVENOUS EVERY 5 MIN PRN
Status: DISCONTINUED | OUTPATIENT
Start: 2024-12-31 | End: 2024-12-31 | Stop reason: HOSPADM

## 2024-12-31 RX ORDER — ROCURONIUM BROMIDE 10 MG/ML
INJECTION, SOLUTION INTRAVENOUS
Status: DISCONTINUED | OUTPATIENT
Start: 2024-12-31 | End: 2024-12-31 | Stop reason: SDUPTHER

## 2024-12-31 RX ORDER — BUPIVACAINE HYDROCHLORIDE 5 MG/ML
INJECTION, SOLUTION EPIDURAL; INTRACAUDAL
Status: COMPLETED | OUTPATIENT
Start: 2024-12-31 | End: 2024-12-31

## 2024-12-31 RX ORDER — DEXAMETHASONE SODIUM PHOSPHATE 10 MG/ML
INJECTION, SOLUTION INTRAMUSCULAR; INTRAVENOUS
Status: DISCONTINUED
Start: 2024-12-31 | End: 2024-12-31 | Stop reason: WASHOUT

## 2024-12-31 RX ORDER — BUPIVACAINE HYDROCHLORIDE 5 MG/ML
INJECTION, SOLUTION EPIDURAL; INTRACAUDAL
Status: COMPLETED
Start: 2024-12-31 | End: 2024-12-31

## 2024-12-31 RX ORDER — LIDOCAINE HYDROCHLORIDE 10 MG/ML
INJECTION, SOLUTION EPIDURAL; INFILTRATION; INTRACAUDAL; PERINEURAL
Status: DISCONTINUED
Start: 2024-12-31 | End: 2024-12-31 | Stop reason: HOSPADM

## 2024-12-31 RX ORDER — HYDRALAZINE HYDROCHLORIDE 20 MG/ML
10 INJECTION INTRAMUSCULAR; INTRAVENOUS
Status: DISCONTINUED | OUTPATIENT
Start: 2024-12-31 | End: 2024-12-31 | Stop reason: HOSPADM

## 2024-12-31 RX ORDER — GLUCAGON 1 MG/ML
1 KIT INJECTION PRN
Status: DISCONTINUED | OUTPATIENT
Start: 2024-12-31 | End: 2024-12-31 | Stop reason: HOSPADM

## 2024-12-31 RX ORDER — FAMOTIDINE 10 MG/ML
INJECTION, SOLUTION INTRAVENOUS
Status: DISCONTINUED | OUTPATIENT
Start: 2024-12-31 | End: 2024-12-31 | Stop reason: SDUPTHER

## 2024-12-31 RX ORDER — OXYCODONE HYDROCHLORIDE 5 MG/1
5 TABLET ORAL PRN
Status: COMPLETED | OUTPATIENT
Start: 2024-12-31 | End: 2024-12-31

## 2024-12-31 RX ORDER — ONDANSETRON 2 MG/ML
INJECTION INTRAMUSCULAR; INTRAVENOUS
Status: DISCONTINUED | OUTPATIENT
Start: 2024-12-31 | End: 2024-12-31 | Stop reason: SDUPTHER

## 2024-12-31 RX ORDER — OXYCODONE HYDROCHLORIDE 5 MG/1
5 TABLET ORAL EVERY 4 HOURS PRN
Qty: 30 TABLET | Refills: 0 | Status: SHIPPED | OUTPATIENT
Start: 2024-12-31 | End: 2025-01-05

## 2024-12-31 RX ORDER — ONDANSETRON 2 MG/ML
4 INJECTION INTRAMUSCULAR; INTRAVENOUS
Status: DISCONTINUED | OUTPATIENT
Start: 2024-12-31 | End: 2024-12-31 | Stop reason: HOSPADM

## 2024-12-31 RX ORDER — SODIUM CHLORIDE, SODIUM LACTATE, POTASSIUM CHLORIDE, CALCIUM CHLORIDE 600; 310; 30; 20 MG/100ML; MG/100ML; MG/100ML; MG/100ML
INJECTION, SOLUTION INTRAVENOUS ONCE
Status: DISCONTINUED | OUTPATIENT
Start: 2024-12-31 | End: 2024-12-31 | Stop reason: HOSPADM

## 2024-12-31 RX ORDER — SODIUM CHLORIDE 0.9 % (FLUSH) 0.9 %
5-40 SYRINGE (ML) INJECTION EVERY 12 HOURS SCHEDULED
Status: DISCONTINUED | OUTPATIENT
Start: 2024-12-31 | End: 2024-12-31 | Stop reason: HOSPADM

## 2024-12-31 RX ORDER — ACETAMINOPHEN 500 MG
500 TABLET ORAL EVERY 6 HOURS PRN
Qty: 60 TABLET | Refills: 1 | Status: SHIPPED | OUTPATIENT
Start: 2024-12-31

## 2024-12-31 RX ORDER — CELECOXIB 200 MG/1
200 CAPSULE ORAL ONCE
Status: COMPLETED | OUTPATIENT
Start: 2024-12-31 | End: 2024-12-31

## 2024-12-31 RX ORDER — LIDOCAINE HYDROCHLORIDE 10 MG/ML
INJECTION, SOLUTION EPIDURAL; INFILTRATION; INTRACAUDAL; PERINEURAL
Status: DISCONTINUED
Start: 2024-12-31 | End: 2024-12-31 | Stop reason: WASHOUT

## 2024-12-31 RX ORDER — LIDOCAINE 4 G/G
1 PATCH TOPICAL AS NEEDED
Status: DISCONTINUED | OUTPATIENT
Start: 2024-12-31 | End: 2024-12-31 | Stop reason: HOSPADM

## 2024-12-31 RX ORDER — ONDANSETRON 4 MG/1
4 TABLET, ORALLY DISINTEGRATING ORAL EVERY 8 HOURS PRN
Qty: 10 TABLET | Refills: 1 | Status: SHIPPED | OUTPATIENT
Start: 2024-12-31

## 2024-12-31 RX ORDER — SODIUM CHLORIDE 0.9 % (FLUSH) 0.9 %
5-40 SYRINGE (ML) INJECTION PRN
Status: DISCONTINUED | OUTPATIENT
Start: 2024-12-31 | End: 2024-12-31 | Stop reason: HOSPADM

## 2024-12-31 RX ORDER — LORAZEPAM 2 MG/ML
0.5 INJECTION INTRAMUSCULAR
Status: DISCONTINUED | OUTPATIENT
Start: 2024-12-31 | End: 2024-12-31 | Stop reason: HOSPADM

## 2024-12-31 RX ORDER — OXYCODONE HYDROCHLORIDE 5 MG/1
10 TABLET ORAL PRN
Status: COMPLETED | OUTPATIENT
Start: 2024-12-31 | End: 2024-12-31

## 2024-12-31 RX ORDER — IPRATROPIUM BROMIDE AND ALBUTEROL SULFATE 2.5; .5 MG/3ML; MG/3ML
1 SOLUTION RESPIRATORY (INHALATION)
Status: DISCONTINUED | OUTPATIENT
Start: 2024-12-31 | End: 2024-12-31 | Stop reason: HOSPADM

## 2024-12-31 RX ORDER — LABETALOL HYDROCHLORIDE 5 MG/ML
10 INJECTION, SOLUTION INTRAVENOUS
Status: DISCONTINUED | OUTPATIENT
Start: 2024-12-31 | End: 2024-12-31 | Stop reason: HOSPADM

## 2024-12-31 RX ORDER — DEXTROSE MONOHYDRATE 100 MG/ML
INJECTION, SOLUTION INTRAVENOUS CONTINUOUS PRN
Status: DISCONTINUED | OUTPATIENT
Start: 2024-12-31 | End: 2024-12-31 | Stop reason: HOSPADM

## 2024-12-31 RX ORDER — NALOXONE HYDROCHLORIDE 0.4 MG/ML
INJECTION, SOLUTION INTRAMUSCULAR; INTRAVENOUS; SUBCUTANEOUS PRN
Status: DISCONTINUED | OUTPATIENT
Start: 2024-12-31 | End: 2024-12-31 | Stop reason: HOSPADM

## 2024-12-31 RX ORDER — METOCLOPRAMIDE HYDROCHLORIDE 5 MG/ML
10 INJECTION INTRAMUSCULAR; INTRAVENOUS
Status: DISCONTINUED | OUTPATIENT
Start: 2024-12-31 | End: 2024-12-31 | Stop reason: HOSPADM

## 2024-12-31 RX ORDER — LIDOCAINE HYDROCHLORIDE 20 MG/ML
INJECTION, SOLUTION EPIDURAL; INFILTRATION; INTRACAUDAL; PERINEURAL
Status: DISCONTINUED | OUTPATIENT
Start: 2024-12-31 | End: 2024-12-31 | Stop reason: SDUPTHER

## 2024-12-31 RX ORDER — MIDAZOLAM HYDROCHLORIDE 1 MG/ML
INJECTION, SOLUTION INTRAMUSCULAR; INTRAVENOUS
Status: DISCONTINUED | OUTPATIENT
Start: 2024-12-31 | End: 2024-12-31 | Stop reason: SDUPTHER

## 2024-12-31 RX ORDER — DEXAMETHASONE SODIUM PHOSPHATE 4 MG/ML
INJECTION, SOLUTION INTRA-ARTICULAR; INTRALESIONAL; INTRAMUSCULAR; INTRAVENOUS; SOFT TISSUE
Status: DISCONTINUED | OUTPATIENT
Start: 2024-12-31 | End: 2024-12-31 | Stop reason: SDUPTHER

## 2024-12-31 RX ORDER — ACETAMINOPHEN 325 MG/1
1000 TABLET ORAL ONCE
Status: COMPLETED | OUTPATIENT
Start: 2024-12-31 | End: 2024-12-31

## 2024-12-31 RX ORDER — PROPOFOL 10 MG/ML
INJECTION, EMULSION INTRAVENOUS
Status: DISCONTINUED | OUTPATIENT
Start: 2024-12-31 | End: 2024-12-31 | Stop reason: SDUPTHER

## 2024-12-31 RX ORDER — HYDROMORPHONE HYDROCHLORIDE 1 MG/ML
0.5 INJECTION, SOLUTION INTRAMUSCULAR; INTRAVENOUS; SUBCUTANEOUS EVERY 5 MIN PRN
Status: DISCONTINUED | OUTPATIENT
Start: 2024-12-31 | End: 2024-12-31 | Stop reason: HOSPADM

## 2024-12-31 RX ORDER — FENTANYL CITRATE 50 UG/ML
INJECTION, SOLUTION INTRAMUSCULAR; INTRAVENOUS
Status: DISCONTINUED | OUTPATIENT
Start: 2024-12-31 | End: 2024-12-31 | Stop reason: SDUPTHER

## 2024-12-31 RX ORDER — DIPHENHYDRAMINE HYDROCHLORIDE 50 MG/ML
12.5 INJECTION INTRAMUSCULAR; INTRAVENOUS
Status: DISCONTINUED | OUTPATIENT
Start: 2024-12-31 | End: 2024-12-31 | Stop reason: HOSPADM

## 2024-12-31 RX ORDER — IBUPROFEN 600 MG/1
600 TABLET, FILM COATED ORAL 3 TIMES DAILY PRN
Qty: 90 TABLET | Refills: 2 | Status: SHIPPED | OUTPATIENT
Start: 2024-12-31

## 2024-12-31 RX ORDER — FENTANYL CITRATE 50 UG/ML
INJECTION, SOLUTION INTRAMUSCULAR; INTRAVENOUS
Status: COMPLETED
Start: 2024-12-31 | End: 2024-12-31

## 2024-12-31 RX ORDER — SENNA AND DOCUSATE SODIUM 50; 8.6 MG/1; MG/1
1 TABLET, FILM COATED ORAL 2 TIMES DAILY PRN
Qty: 30 TABLET | Refills: 0 | Status: SHIPPED | OUTPATIENT
Start: 2024-12-31

## 2024-12-31 RX ORDER — MEPERIDINE HYDROCHLORIDE 25 MG/ML
12.5 INJECTION INTRAMUSCULAR; INTRAVENOUS; SUBCUTANEOUS EVERY 5 MIN PRN
Status: DISCONTINUED | OUTPATIENT
Start: 2024-12-31 | End: 2024-12-31 | Stop reason: HOSPADM

## 2024-12-31 RX ADMIN — BUPIVACAINE HYDROCHLORIDE 30 ML: 5 INJECTION, SOLUTION EPIDURAL; INTRACAUDAL at 13:06

## 2024-12-31 RX ADMIN — MIDAZOLAM HYDROCHLORIDE 2 MG: 1 INJECTION, SOLUTION INTRAMUSCULAR; INTRAVENOUS at 12:56

## 2024-12-31 RX ADMIN — FENTANYL CITRATE 50 MCG: 50 INJECTION, SOLUTION INTRAMUSCULAR; INTRAVENOUS at 13:48

## 2024-12-31 RX ADMIN — SODIUM CHLORIDE: 9 INJECTION, SOLUTION INTRAVENOUS at 13:40

## 2024-12-31 RX ADMIN — FENTANYL CITRATE 50 MCG: 50 INJECTION, SOLUTION INTRAMUSCULAR; INTRAVENOUS at 15:36

## 2024-12-31 RX ADMIN — LIDOCAINE HYDROCHLORIDE 40 MG: 20 INJECTION, SOLUTION EPIDURAL; INFILTRATION; INTRACAUDAL; PERINEURAL at 13:48

## 2024-12-31 RX ADMIN — PROPOFOL 150 MG: 10 INJECTION, EMULSION INTRAVENOUS at 13:48

## 2024-12-31 RX ADMIN — ACETAMINOPHEN 975 MG: 325 TABLET ORAL at 10:59

## 2024-12-31 RX ADMIN — OXYCODONE 10 MG: 5 TABLET ORAL at 17:23

## 2024-12-31 RX ADMIN — FAMOTIDINE 20 MG: 10 INJECTION, SOLUTION INTRAVENOUS at 13:57

## 2024-12-31 RX ADMIN — ROCURONIUM BROMIDE 50 MG: 10 INJECTION, SOLUTION INTRAVENOUS at 13:49

## 2024-12-31 RX ADMIN — CEFAZOLIN 2000 MG: 1 INJECTION, POWDER, FOR SOLUTION INTRAMUSCULAR; INTRAVENOUS at 13:59

## 2024-12-31 RX ADMIN — DEXAMETHASONE SODIUM PHOSPHATE 4 MG: 4 INJECTION, SOLUTION INTRA-ARTICULAR; INTRALESIONAL; INTRAMUSCULAR; INTRAVENOUS; SOFT TISSUE at 13:57

## 2024-12-31 RX ADMIN — CELECOXIB 200 MG: 200 CAPSULE ORAL at 10:59

## 2024-12-31 RX ADMIN — ONDANSETRON 4 MG: 2 INJECTION INTRAMUSCULAR; INTRAVENOUS at 14:02

## 2024-12-31 ASSESSMENT — PAIN - FUNCTIONAL ASSESSMENT
PAIN_FUNCTIONAL_ASSESSMENT: 0-10

## 2024-12-31 ASSESSMENT — PAIN DESCRIPTION - ORIENTATION: ORIENTATION: LEFT

## 2024-12-31 ASSESSMENT — PAIN DESCRIPTION - DESCRIPTORS
DESCRIPTORS: ACHING;THROBBING
DESCRIPTORS: ACHING;PRESSURE;THROBBING
DESCRIPTORS: THROBBING;ACHING;PRESSURE

## 2024-12-31 ASSESSMENT — PAIN DESCRIPTION - LOCATION: LOCATION: SHOULDER

## 2024-12-31 ASSESSMENT — PAIN SCALES - GENERAL: PAINLEVEL_OUTOF10: 7

## 2024-12-31 NOTE — ANESTHESIA PROCEDURE NOTES
Peripheral Block    Patient location during procedure: pre-op  Reason for block: post-op pain management and at surgeon's request  Start time: 12/31/2024 1:03 PM  End time: 12/31/2024 1:06 PM  Staffing  Performed: resident/CRNA   Anesthesiologist: Santi Bustamante MD  Resident/CRNA: Oscar Mustafa APRN - CRNA  Performed by: Oscar Mustafa APRN - CRNA  Authorized by: Santi Bustamante MD    Preanesthetic Checklist  Completed: patient identified, IV checked, site marked, risks and benefits discussed, surgical/procedural consents, equipment checked, pre-op evaluation, timeout performed, anesthesia consent given, oxygen available, monitors applied/VS acknowledged, fire risk safety assessment completed and verbalized and blood product R/B/A discussed and consented  Peripheral Block   Patient position: sitting  Prep: ChloraPrep  Provider prep: mask and sterile gloves  Patient monitoring: cardiac monitor, continuous pulse ox, IV access, continuous capnometry, frequent blood pressure checks, oxygen and responsive to questions  Block type: Brachial plexus  Interscalene  Laterality: left  Injection technique: single-shot  Guidance: ultrasound guided  Local infiltration: lidocaine  Infiltration strength: 1 %  Local infiltration: lidocaine  Dose: 3 mL    Needle   Needle type: insulated echogenic nerve stimulator needle (P[place]unk)   Needle gauge: 22 G  Needle localization: ultrasound guidance  Needle length: 5 cm  Assessment   Injection assessment: negative aspiration for heme, no paresthesia on injection, local visualized surrounding nerve on ultrasound and no intravascular symptoms  Paresthesia pain: none  Slow fractionated injection: yes  Hemodynamics: stable  Outcomes: uncomplicated and patient tolerated procedure well    Medications Administered  BUPivacaine (MARCAINE) PF injection 0.5% - Perineural   30 mL - 12/31/2024 1:06:00 PM

## 2024-12-31 NOTE — DISCHARGE INSTRUCTIONS
Orthopaedic Surgery [facility]  [facility] [phone]      HOME DISCHARGE INSTRUCTIONS FOR:    ARTHROSCOPIC ROTATOR CUFF REPAIR    ICE:     Use it as often as you can for the next 7 to 10 days.  Ice bags/packs/bladder should be used for 20 to 30 min every 3 to 4 hrs during waking hours (minimum of 8 hrs/day), especially after physical therapy sessions.  Be sure to protect your skin from frostbite with a washcloth, towel, or ace wrap between the ice bag/pack and your skin.    IMMOBILIZER:    Your arm will need to be elevated and immobilized in the sling for the majority of the next 6 weeks, especially at night.  You may wear it outside of your clothes if you prefer, but it is important that you keep your arm protected so that you don't accidentally rip or tear out the sutures holding the rotator cuff to the bone. However, you should take your arm out of the sling at least 3 times per day to perform elbow, wrist, and gentle shoulder range of motion (ROM) exercises as instructed by your physical therapist.  Otherwise, your elbow, wrist, and shoulder joints will get very stiff, and you may permanently lose shoulder motion.     DRESSING:     You may change the dressing on your shoulder 3 days after surgery. The dressing is usually bloody, do not be alarmed, this is left over blood and arthroscopic fluid leaking out of your shoulder.  You may have 4-5 small incisions (arthroscopy portals.  If there is still drainage from the wounds, keep these covered with clean, dry gauze bandages, or band-aids.  They should be changed daily until the wound is dry.  Once the wound is completely dry, you may leave the dressing off.  If steri-strips were applied, leave them on until they come off on their own (about 1-2 weeks).      BATHING:     You should keep your incisions clean dry until 3 days after surgery, then you may shower.    You may want to place a dry cloth/towel under your armpit during the day to keep this area

## 2024-12-31 NOTE — ANESTHESIA PRE PROCEDURE
Department of Anesthesiology  Preprocedure Note       Name:  Tani Diez   Age:  39 y.o.  :  1985                                          MRN:  [id number]         Date:  2024      Surgeon: Surgeon(s):  Robyn Rosa MD    Procedure: Procedure(s):  LEFT SHOULDER ARTHROSCOPY WITH ROTATOR CUFF REPAIR, DEBRIDEMENT, OPEN BICEPS TENODESIS    Medications prior to admission:   Prior to Admission medications    Medication Sig Start Date End Date Taking? Authorizing Provider   acetaminophen (TYLENOL) 500 MG tablet Take 1 tablet by mouth every 6 hours as needed for Pain 24  Yes Robyn Rosa MD   ibuprofen (ADVIL;MOTRIN) 600 MG tablet Take 1 tablet by mouth 3 times daily as needed for Pain 24  Yes Robyn Rosa MD       Current medications:    Current Facility-Administered Medications   Medication Dose Route Frequency Provider Last Rate Last Admin    sodium chloride flush 0.9 % injection 5-40 mL  5-40 mL IntraVENous 2 times per day Robyn Rosa MD        sodium chloride flush 0.9 % injection 5-40 mL  5-40 mL IntraVENous PRN Robyn Rosa MD        0.9 % sodium chloride infusion   IntraVENous PRN Robyn Rosa MD        ceFAZolin (ANCEF) 2,000 mg in sterile water 20 mL IV syringe  2,000 mg IntraVENous On Call to OR Robyn Rosa MD        fentaNYL (SUBLIMAZE) 100 MCG/2ML injection             lidocaine PF 1 % injection             BUPivacaine (PF) (MARCAINE) 0.5 % injection                Allergies:  No Known Allergies    Problem List:    Patient Active Problem List   Diagnosis Code    Closed fracture of lower end of left radius with routine healing S52.502D    Traumatic incomplete tear of left rotator cuff S46.012A    Bicipital tendinitis of left shoulder M75.22       Past Medical History:        Diagnosis Date    Shoulder injury        Past Surgical History:        Procedure Laterality Date    WRIST SURGERY Left

## 2024-12-31 NOTE — OP NOTE
Operative Note      Patient: Tani Diez  YOB: 1985  MRN: [id number]    Date of Procedure: 12/31/2024    Pre-Op Diagnosis Codes:      * Traumatic incomplete tear of left rotator cuff, subsequent encounter [S46.012D]     * Bicipital tendinitis of left shoulder [M75.22]    Post-Op Diagnosis: Same       Procedure(s):  LEFT SHOULDER ARTHROSCOPY WITH ROTATOR CUFF REPAIR, DEBRIDEMENT, OPEN BICEPS TENODESIS    Surgeon(s):  Robyn Rosa MD    Assistant:   Surgical Assistant: Lilli Trammell    Anesthesia: Regional    Estimated Blood Loss (mL): less than 50     Complications: None    Specimens:   * No specimens in log *    Implants:  Implant Name Type Inv. Item Serial No.  Lot No. LRB No. Used Action   ANCHOR SUTURE KNOTLESS 2 4.75X19.1 MM LOOP FIBERTAPE PEEK BL - [id number]  ANCHOR SUTURE KNOTLESS 2 4.75X19.1 MM LOOP FIBERTAPE PEEK BL  [employer] [id number] Left 1 Implanted   FIXATION KIT KNOTLESS TENSIONABLE BICEPS DRL GUIDE FIBERTAK - SNA  FIXATION KIT KNOTLESS TENSIONABLE BICEPS DRL GUIDE FIBERTAK NA [employer] [id number] Left 1 Implanted         Drains: * No LDAs found *    Findings:  Infection Present At Time Of Surgery (PATOS) (choose all levels that have infection present):  No infection present  Other Findings: left shoulder high grade partial thickness bursal sided tear at footprint of supraspinatus involving over 60% of tendon width, biceps tendinitis, subacromial bursitis.    Indications for the Procedure:   39 y.o. male  with RIGHT/LEFT: left shoulder pain after a fall at work and evidence of high-grade partial-thickness supraspinatus tendon tear and biceps tendinitis on MRI .  Discussed nonoperative treatment which patient has already exhausted with rest, activity modification, ice, anti-inflammatories, physical therapy.  Discussed that because it looks like the tear involves over 50% of the tendon on the bursal side I would recommend surgery, if it was

## 2024-12-31 NOTE — INTERVAL H&P NOTE
Update History & Physical    The patient's History and Physical of December 2, 2024 was reviewed with the patient and I examined the patient. There was no change. The surgical site was confirmed by the patient and me.     Plan: The risks, benefits, expected outcome, and alternative to the recommended procedure have been discussed with the patient. Patient understands and wants to proceed with the procedure.     Electronically signed by Robyn Rosa MD on 12/31/2024 at 11:54 AM

## 2025-01-11 NOTE — ANESTHESIA POSTPROCEDURE EVALUATION
Department of Anesthesiology  Postprocedure Note    Patient: Tani Diez  MRN: [id number]  YOB: 1985    Procedure Summary       Date: 12/31/24 Room / Location: [facility] MAIN OR 08 / [facility] MAIN OR    Anesthesia Start: 1340 Anesthesia Stop: 1609    Procedure: LEFT SHOULDER ARTHROSCOPY WITH ROTATOR CUFF REPAIR, DEBRIDEMENT, OPEN BICEPS TENODESIS (Left: Shoulder) Diagnosis:       Traumatic incomplete tear of left rotator cuff, subsequent encounter      Bicipital tendinitis of left shoulder      (Traumatic incomplete tear of left rotator cuff, subsequent encounter [S46.012D])      (Bicipital tendinitis of left shoulder [M75.22])    Surgeons: Robyn Rosa MD Responsible Provider: Santi Bustamante MD    Anesthesia Type: General ASA Status: 1            Anesthesia Type: General    Tomeka Phase I: Tomeka Score: 10    Tomeka Phase II: Tomeka Score: 9    Anesthesia Post Evaluation    Patient location during evaluation: PACU  Patient participation: complete - patient cannot participate  Level of consciousness: awake  Pain score: 0  Airway patency: patent  Nausea & Vomiting: no nausea and no vomiting  Cardiovascular status: hemodynamically stable  Respiratory status: acceptable  Hydration status: stable  Multimodal analgesia pain management approach        No notable events documented.

## 2025-01-15 ENCOUNTER — OFFICE VISIT (OUTPATIENT)
Age: 40
End: 2025-01-15

## 2025-01-15 VITALS
SYSTOLIC BLOOD PRESSURE: 165 MMHG | HEIGHT: 62 IN | TEMPERATURE: 97.5 F | DIASTOLIC BLOOD PRESSURE: 102 MMHG | BODY MASS INDEX: 32.2 KG/M2 | OXYGEN SATURATION: 97 % | WEIGHT: 175 LBS | HEART RATE: 92 BPM

## 2025-01-15 DIAGNOSIS — Z98.890 S/P ROTATOR CUFF REPAIR: ICD-10-CM

## 2025-01-15 DIAGNOSIS — M25.512 ACUTE PAIN OF LEFT SHOULDER: ICD-10-CM

## 2025-01-15 DIAGNOSIS — S46.012D TRAUMATIC INCOMPLETE TEAR OF LEFT ROTATOR CUFF, SUBSEQUENT ENCOUNTER: ICD-10-CM

## 2025-01-15 DIAGNOSIS — S52.572D OTHER CLOSED INTRA-ARTICULAR FRACTURE OF DISTAL END OF LEFT RADIUS WITH ROUTINE HEALING, SUBSEQUENT ENCOUNTER: Primary | ICD-10-CM

## 2025-01-15 DIAGNOSIS — M75.82 ROTATOR CUFF TENDINITIS, LEFT: ICD-10-CM

## 2025-01-15 DIAGNOSIS — M75.22 BICEPS TENDINITIS OF LEFT UPPER EXTREMITY: ICD-10-CM

## 2025-01-15 DIAGNOSIS — S52.572A OTHER CLOSED INTRA-ARTICULAR FRACTURE OF DISTAL END OF LEFT RADIUS, INITIAL ENCOUNTER: ICD-10-CM

## 2025-01-15 PROCEDURE — 99024 POSTOP FOLLOW-UP VISIT: CPT | Performed by: ORTHOPAEDIC SURGERY

## 2025-01-15 ASSESSMENT — PATIENT HEALTH QUESTIONNAIRE - PHQ9
SUM OF ALL RESPONSES TO PHQ9 QUESTIONS 1 & 2: 0
1. LITTLE INTEREST OR PLEASURE IN DOING THINGS: NOT AT ALL
SUM OF ALL RESPONSES TO PHQ QUESTIONS 1-9: 0
SUM OF ALL RESPONSES TO PHQ QUESTIONS 1-9: 0
2. FEELING DOWN, DEPRESSED OR HOPELESS: NOT AT ALL
SUM OF ALL RESPONSES TO PHQ QUESTIONS 1-9: 0
2. FEELING DOWN, DEPRESSED OR HOPELESS: SEVERAL DAYS
1. LITTLE INTEREST OR PLEASURE IN DOING THINGS: NOT AT ALL
SUM OF ALL RESPONSES TO PHQ QUESTIONS 1-9: 1
SUM OF ALL RESPONSES TO PHQ QUESTIONS 1-9: 0
SUM OF ALL RESPONSES TO PHQ QUESTIONS 1-9: 1
SUM OF ALL RESPONSES TO PHQ9 QUESTIONS 1 & 2: 1

## 2025-01-15 NOTE — PROGRESS NOTES
Mr.Pucheta Diez  is here for a follow up visit after undergoing Left Shoulder Arthroscopy With Rotator Cuff Repair, Debridement, Open Biceps Tenodesis - Left on 12/31/2024.   S/p  Left Wrist Open Reduction Internal Fixation - Left on 7/30/2024.  Pain has been appropriate since surgery, no major medical complications since surgery. Patient reports pain is controlled on ibuprofen and tylenol.  He reports oxycodone made him dizzy and stopped taking it.    The patient denies fevers, chills, nausea, vomiting, numbness/paresthesias.  The patient has been following the weight bearing precautions: NWB LUE in ultrasling.  The patient has not started physical therapy.     Current Outpatient Medications on File Prior to Visit   Medication Sig Dispense Refill    acetaminophen (TYLENOL) 500 MG tablet Take 1 tablet by mouth every 6 hours as needed for Pain 60 tablet 1    ibuprofen (ADVIL;MOTRIN) 600 MG tablet Take 1 tablet by mouth 3 times daily as needed for Pain 90 tablet 2    ondansetron (ZOFRAN-ODT) 4 MG disintegrating tablet Take 1 tablet by mouth every 8 hours as needed for Nausea or Vomiting (Patient not taking: Reported on 1/15/2025) 10 tablet 1    sennosides-docusate sodium (SENOKOT-S) 8.6-50 MG tablet Take 1 tablet by mouth 2 times daily as needed for Constipation (Patient not taking: Reported on 1/15/2025) 30 tablet 0     No current facility-administered medications on file prior to visit.       ROS:  General: denies agitation, fevers/chills  Cardiac: denies major chest pain  Gastrointestinal: denies nausea/vomiting  Neurologic: Denies numbness/paresthesias  Skin: Denies erythema, warmth to touch, active drainage  Musculoskeletal: Endorses appropriate pain at surgical site      Physical Examination:    Blood pressure (!) 165/102, pulse 92, temperature 97.5 °F (36.4 °C), temperature source Temporal, height 1.575 m (5' 2.01\"), weight 79.4 kg (175 lb), SpO2 97%.    GENERAL: Patient is a healthy-appearing and in no

## 2025-01-15 NOTE — PROGRESS NOTES
Identified pt with two pt identifiers (name and ). Reviewed chart in preparation for visit and have obtained necessary documentation.    Tani Diez is a 39 y.o. male Post-Op Check (Left shoulder SX 2024)  .    Vitals:    01/15/25 0911 01/15/25 0917   BP: (!) 174/103 (!) 165/102   Site: Right Upper Arm Right Upper Arm   Position: Sitting Sitting   Cuff Size: Medium Adult Medium Adult   Pulse: 88 92   Temp: 97.5 °F (36.4 °C)    TempSrc: Temporal    SpO2: 97% 97%   Weight: 79.4 kg (175 lb)    Height: 1.575 m (5' 2.01\")           1. Have you been to the ER, urgent care clinic since your last visit?  Hospitalized since your last visit?  no     2. Have you seen or consulted any other health care providers outside of the [facility] since your last visit?  Include any pap smears or colon screening.  No    BP elevated. Patient advised to  follow up with PCP and check BP twice a day at home.

## 2025-02-03 ENCOUNTER — HOSPITAL ENCOUNTER (OUTPATIENT)
Facility: HOSPITAL | Age: 40
Setting detail: RECURRING SERIES
Discharge: HOME | End: 2025-02-06
Payer: COMMERCIAL

## 2025-02-03 PROCEDURE — 97110 THERAPEUTIC EXERCISES: CPT

## 2025-02-03 PROCEDURE — 97161 PT EVAL LOW COMPLEX 20 MIN: CPT

## 2025-02-03 NOTE — THERAPY EVALUATION
[]time []transportation []Other:  Substance use: []Alcohol []Tobacco []other:   Pt Goals: regain use of left arm  Motivation: wnl  Cognition: A & O x wnl             OBJECTIVE    Wife here with him.  Shrugs; intact but reluctant/guarded on left   Elbow flex/ext'n arom intact  Left pronation full  Left supination not quite full  Wrist ext'n prom 70 right, 50 left  Wrist flex prom 60 right, 20 left  Wrist ulnar/radial dev'n wfl  Supine prom shoulder        flex    abd     ER     IR                             Right      165     180      120    30                              Left        90        90       10     10      Objective/Functional Outcome Measure: 77%  AM-PAC  AM-PAC score = an established functional score where 0% = no disability       39 min [x]Eval - untimed                        Therapeutic Procedures:  Tx Min Billable or 1:1 Min (if diff from Tx Min) Procedure, Rationale, Specifics   21  13333 Therapeutic Exercise (timed):  increase ROM, strength, coordination, balance, and proprioception to improve patient's ability to progress to PLOF and address remaining functional goals. (see flow sheet as applicable)    Details if applicable:           Details if applicable:           Details if applicable:           Details if applicable:           Details if applicable:     21     Total Total         [x]  Patient Education billed concurrently with other procedures   [x] Review HEP  shrugs, curls, pendulum, 2 hand table top towel slide  [] Progressed/Changed HEP, detail:    [] Other detail:         Pain Level at end of session (0-10 scale): 8    Plan of Care / Statement of Necessity for Physical Therapy Services     Assessment / key information:  \"tolerated today's PT session OK.\"  Transitioning out of sling post op so unable to use arm functionally yet for reach/lift/carry/dress/groom. This patient should benefit from skilled PT to address the above impairments so that they are able to return to their PLOF

## 2025-02-05 ENCOUNTER — HOSPITAL ENCOUNTER (OUTPATIENT)
Facility: HOSPITAL | Age: 40
Setting detail: RECURRING SERIES
Discharge: HOME | End: 2025-02-08
Attending: ORTHOPAEDIC SURGERY
Payer: COMMERCIAL

## 2025-02-05 PROCEDURE — 97110 THERAPEUTIC EXERCISES: CPT

## 2025-02-05 PROCEDURE — 97140 MANUAL THERAPY 1/> REGIONS: CPT

## 2025-02-05 PROCEDURE — 97140 MANUAL THERAPY 1/> REGIONS: CPT | Performed by: PHYSICAL THERAPIST

## 2025-02-05 PROCEDURE — 97110 THERAPEUTIC EXERCISES: CPT | Performed by: PHYSICAL THERAPIST

## 2025-02-05 NOTE — PROGRESS NOTES
OCCUPATIONAL THERAPY - DAILY TREATMENT NOTE (updated 3/23)      Date: 2025          Patient Name:  Tani Diez :  1985   Medical   Diagnosis:  Other closed intra-articular fracture of distal end of left radius with routine healing, subsequent encounter [N36.464D] Treatment Diagnosis:  M25.532  LEFT WRIST PAIN    Referral Source:  Robyn Rosa* Insurance:   Payor: GENERIC SELF-INSURED / Plan: GENERIC SELF-INSURED WC / Product Type: *No Product type* /                     Patient  verified yes     Visit #   Current  / Total 5 8   Time   In / Out 325 415   Total Treatment Time 50   Total Timed Codes 50         SUBJECTIVE    Pain Level (0-10 scale): 4-L wrist    Any medication changes, allergies to medications, adverse drug reactions, diagnosis change, or new procedure performed?: [x] No    [] Yes (see summary sheet for update)  Medications: Verified on Patient Summary List    Subjective functional status/changes:     --patient reports radiating pain in the L wrist with flexion/extension while grasping items    OBJECTIVE      Therapeutic Procedures:  Tx Min Billable or 1:1 Min (if diff from Tx Min) Procedure, Rationale, Specifics   40 40 51799 Therapeutic Exercise (timed):  increase ROM, strength, coordination, balance, and proprioception to improve patient's ability to progress to PLOF and address remaining functional goals. (see flow sheet as applicable)    Details if applicable:  LUE/wrist/hand RoM, strength   10 10     70929 Manual Therapy (timed):  decrease pain and increase ROM to improve patient's ability to progress to PLOF and address remaining functional goals.  The manual therapy interventions were performed at a separate and distinct time from the therapeutic activities interventions . (see flow sheet as applicable)    Details if applicable:   L wrist         Details if applicable:           Details if applicable:           Details if applicable:     50 50    Total Total 
- 15 % gain in L hand  strength              [] Met [x] Not met [] Partially met  Date: 2/5/25  3.  Patient will demonstrate a 20-25 % improvement in left hand dexterity skills (9 hole peg test) to promote  independence using controls, tools, etc at work              [] Met [x] Not met [] Partially met  Date: 2/5/25     Long Term Goals: To be accomplished in 8 treatments.   Patient will demonstrate a 10 % gain on the Ampac  [] Met [] Not met [] Partially met  Date: 2/5/25--decline  2.  Patient will demonstrate L hand  strength that is WFL's for his age group in preparation for return to work duties              [] Met [x] Not met [] Partially met  Date: 2/5/25  3. Patient will demonstrate left hand dexterity/FMC (per 9 hole peg) that is WFL's for his age group in preparation for return to work duties              [] Met [x] Not met [] Partially met  Date: 2/5/25       RECOMMENDATIONS FOR SKILLED THERAPY  Continue skilled OT services for 3 additional  approved visits        Leslye Quiros OT       2/5/2025       6:03 PM    If you have any questions/comments please contact us directly at [phone].   Thank you for allowing us to assist in the care of your patient.

## 2025-02-12 ENCOUNTER — HOSPITAL ENCOUNTER (OUTPATIENT)
Facility: HOSPITAL | Age: 40
Setting detail: RECURRING SERIES
Discharge: HOME | End: 2025-02-15
Attending: ORTHOPAEDIC SURGERY
Payer: COMMERCIAL

## 2025-02-12 PROCEDURE — 97140 MANUAL THERAPY 1/> REGIONS: CPT

## 2025-02-12 PROCEDURE — 97110 THERAPEUTIC EXERCISES: CPT

## 2025-02-12 NOTE — PROGRESS NOTES
with other procedures   [] Review HEP    [] Progressed/Changed HEP, detail:   [] Other detail:       Other Objective/Functional Measures  L wrist ext; 60 (51) L wrist flexion: 32 (23) L wrist RD: 14  (14)    Fine Motor:   9 HOLE PEG TEST: (in seconds)       DATE  12/16/24    DATE      Left 36  23    Norms, male 41-45  Average: 16-24  seconds    Pain Level at end of session (0-10 scale):6--LUE      Assessment    Patient demonstrates Improvement in L wrist flexion and extension.  No change in radial deviation .  Patient also demonstrates improvement in L hand dexterity--has met goal # 3  Patient will continue to benefit from skilled PT / OT services to modify and progress therapeutic interventions, analyze and address ROM deficits, analyze and address strength deficits, and analyze and address soft tissue restrictions to address functional deficits and attain remaining goals.    Progress toward goals / Updated goals:  []  See Progress Note/Recertification    [facility]: 47.98 %  Short Term Goals: To be accomplished in 4-5 treatments.   Patient will be independent with HeP to promote gains between sessions  [] Met [] Not met [x] Partially met  Date: 12/20/24  2.  Patient will demonstrate a 10 - 15 % gain in L hand  strength              [] Met [] Not met [] Partially met  Date:   3.  Patient will demonstrate a 20-25 % improvement in left hand dexterity skills (9 hole peg test) to promote  independence using controls, tools, etc at work              [x] Met [] Not met [] Partially met  Date: 2/12/25     Long Term Goals: To be accomplished in 8 treatments.   Patient will demonstrate a 10 % gain on the Ampac  [] Met [] Not met [] Partially met  Date:   2.  Patient will demonstrate L hand  strength that is WFL's for his age group in preparation for return to work duties              [] Met [] Not met [] Partially met  Date:   3. Patient will demonstrate left hand dexterity/FMC (per 9 hole peg) that is WFL's for his age

## 2025-02-12 NOTE — PROGRESS NOTES
PHYSICAL THERAPY - DAILY TREATMENT NOTE (updated 3/23)      Date: 2025          Patient Name:  Tani Diez :  1985   Medical   Diagnosis:  S/P rotator cuff repair [Z98.890] Treatment Diagnosis:  M25.512  LEFT SHOULDER PAIN    Referral Source:  Robyn Rosa* Insurance:   Payor: GENERIC SELF-INSURED / Plan: GENERIC SELF-INSURED WC / Product Type: *No Product type* /                     Patient  verified yes     Visit #   Current  / Total 3 12   Time   In / Out 230 330   Total Treatment Time 60   Total Timed Codes 45         SUBJECTIVE    Pain Level (0-10 scale): 8    Any medication changes, allergies to medications, adverse drug reactions, diagnosis change, or new procedure performed?: [x] No    [] Yes (see summary sheet for update)  Medications: Verified on Patient Summary List    Subjective functional status/changes:     Shoulder hurts all the time; pain interrupts sleep    OBJECTIVE      Therapeutic Procedures:  Tx Min Billable or 1:1 Min (if diff from Tx Min) Procedure, Rationale, Specifics   25  08142 Therapeutic Exercise (timed):  increase ROM, strength, coordination, balance, and proprioception to improve patient's ability to progress to PLOF and address remaining functional goals. (see flow sheet as applicable)     Details if applicable:     20  47747 Manual Therapy (timed):  decrease pain and increase ROM to improve patient's ability to progress to PLOF and address remaining functional goals.  The manual therapy interventions were performed at a separate and distinct time from the therapeutic activities interventions . (see flow sheet as applicable)     Details if applicable:           Details if applicable:           Details if applicable:            Details if applicable:     45     Total Total         [x]  Patient Education billed concurrently with other procedures   [x] Review HEP  pullovers, dps  [] Progressed/Changed HEP, detail:    [] Other detail:         Other

## 2025-02-14 ENCOUNTER — APPOINTMENT (OUTPATIENT)
Facility: HOSPITAL | Age: 40
End: 2025-02-14
Attending: ORTHOPAEDIC SURGERY
Payer: COMMERCIAL

## 2025-02-21 ENCOUNTER — HOSPITAL ENCOUNTER (OUTPATIENT)
Facility: HOSPITAL | Age: 40
Setting detail: RECURRING SERIES
Discharge: HOME | End: 2025-02-24
Attending: ORTHOPAEDIC SURGERY
Payer: COMMERCIAL

## 2025-02-21 PROCEDURE — 97110 THERAPEUTIC EXERCISES: CPT

## 2025-02-21 NOTE — PROGRESS NOTES
PHYSICAL THERAPY - DAILY TREATMENT NOTE (updated 3/23)      Date: 2025          Patient Name:  Tani Diez :  1985   Medical   Diagnosis:  S/P rotator cuff repair [Z98.890] Treatment Diagnosis:  M25.512  LEFT SHOULDER PAIN    Referral Source:  Robyn Rosa* Insurance:   Payor: GENERIC SELF-INSURED / Plan: GENERIC SELF-INSURED WC / Product Type: *No Product type* /                     Patient  verified yes     Visit #   Current  / Total 4 12   Time   In / Out 3:15 pm 4:00 pm   Total Treatment Time 45   Total Timed Codes 45         SUBJECTIVE    Pain Level (0-10 scale): 6    Any medication changes, allergies to medications, adverse drug reactions, diagnosis change, or new procedure performed?: [x] No    [] Yes (see summary sheet for update)  Medications: Verified on Patient Summary List    Subjective functional status/changes:     No new complaints.    OBJECTIVE      Therapeutic Procedures:  Tx Min Billable or 1:1 Min (if diff from Tx Min) Procedure, Rationale, Specifics   45  81531 Therapeutic Exercise (timed):  increase ROM, strength, coordination, balance, and proprioception to improve patient's ability to progress to PLOF and address remaining functional goals. (see flow sheet as applicable)     Details if applicable:       56367 Manual Therapy (timed):  decrease pain and increase ROM to improve patient's ability to progress to PLOF and address remaining functional goals.  The manual therapy interventions were performed at a separate and distinct time from the therapeutic activities interventions . (see flow sheet as applicable)     Details if applicable:           Details if applicable:           Details if applicable:            Details if applicable:     45     Total Total         [x]  Patient Education billed concurrently with other procedures   [x] Review HEP  pullovers, dps  [] Progressed/Changed HEP, detail:    [] Other detail:         Other Objective/Functional

## 2025-02-21 NOTE — PROGRESS NOTES
applicable:   L wrist         Details if applicable:           Details if applicable:           Details if applicable:     39 39    Total Total       [x]  Patient Education billed concurrently with other procedures   [] Review HEP    [] Progressed/Changed HEP, detail:   [x] Other detail: pain management strategies      Other Objective/Functional Measures  L :  65 lbs (was 54 , post R shoulder surgery)      Pain Level at end of session (0-10 scale):5      Assessment   Patient demonstrates an 11 pound increase in left hand  strength; now 65 lbs.  Provided  education  on pain management strategies  Patient will continue to benefit from skilled PT / OT services to modify and progress therapeutic interventions, analyze and address ROM deficits, analyze and address strength deficits, and analyze and address soft tissue restrictions to address functional deficits and attain remaining goals.    Progress toward goals / Updated goals:  []  See Progress Note/Recertification    [facility]: 47.98 %  Short Term Goals: To be accomplished in 4-5 treatments.   Patient will be independent with HeP to promote gains between sessions  [] Met [] Not met [x] Partially met  Date: 12/20/24  2.  Patient will demonstrate a 10 - 15 % gain in L hand  strength              [] Met [] Not met [x] Partially met  Date: 2/21/25 (post surgery value 2/5/25)  3.  Patient will demonstrate a 20-25 % improvement in left hand dexterity skills (9 hole peg test) to promote  independence using controls, tools, etc at work              [x] Met [] Not met [] Partially met  Date: 2/12/25     Long Term Goals: To be accomplished in 8 treatments.   Patient will demonstrate a 10 % gain on the Ampac  [] Met [] Not met [] Partially met  Date:   2.  Patient will demonstrate L hand  strength that is WFL's for his age group in preparation for return to work duties              [] Met [] Not met [] Partially met  Date:   3. Patient will demonstrate left hand

## 2025-02-24 ENCOUNTER — HOSPITAL ENCOUNTER (OUTPATIENT)
Facility: HOSPITAL | Age: 40
Setting detail: RECURRING SERIES
Discharge: HOME | End: 2025-02-27
Attending: ORTHOPAEDIC SURGERY
Payer: COMMERCIAL

## 2025-02-24 PROCEDURE — 97110 THERAPEUTIC EXERCISES: CPT

## 2025-02-24 NOTE — PROGRESS NOTES
applicable:     49 49    Total Total       [x]  Patient Education billed concurrently with other procedures   [] Review HEP    [] Progressed/Changed HEP, detail:    [x]Other detail: results of re-assessment      Other Objective/Functional Measures     Measurements: Taken with Eliecer Dynamometer, in lbs   Level 2 DATE  12/16/24 DATE  12/30/24 DATE  2/5/25 Date  2/24/25   Right 105  n/t  n/t N/t   Left 73  67  54 73   Norms: male 40-44, non-dominant   94 - 130 lbs        Pinch Measurements: Taken with Pinch Gauge, in lbs     Date  12/16/24 Date  2/5/25 Date  2/24/25    3 pt       Right  17  n/t N/t   Left  15 12  14.5   Lateral: Right 16  n/t N/t   Left 16  15 19   Tip:       Right 12.5  n/t N/t   Left 13  12 13   Norms: male 40-44,  non-dominant  3 point: 15-37 lbs  Lateral; 19-31 lbs  Tip: 12-25 lbs    ROM:                        Active Active/Passive   DATE:   12/16/24 12/16/24 2/5/25 2/24/25        left right left   left   Wrist Flex / Ext 30 / 52 60 / 65  23 / 51  44/68     Ulnar/ Radial Dev 24 / 9 31 / 20 24 / 14 23 / 16 12/16 2/24/25  Forearm-  left Sup / Pronation 78 / 63    75 / 70       Pain Level at end of session (0-10 scale):5-6, L wrist      Assessment   Patient seen for re-assessment secondary to planned discharge--seen 8/8 visits approved for the L wrist.  Patient to continue PT to address remaining left shoulder/UE  deficits.  MD(ortho) follow up visit scheduled for 2/28/24. See discharge summary for details    Progress toward goals / Updated goals:  []  See Progress Note/Recertification    [facility]: 47.98 %  Short Term Goals: To be accomplished in 4-5 treatments.   Patient will be independent with HeP to promote gains between sessions  [] Met [] Not met [x] Partially met  Date: 12/20/24  2.  Patient will demonstrate a 10 - 15 % gain in L hand  strength              [] Met [] Not met [x] Partially met  Date: 2/21/25 (post surgery value 2/5/25)  3.  Patient will demonstrate a 20-25 %

## 2025-02-24 NOTE — PROGRESS NOTES
PHYSICAL THERAPY - DAILY TREATMENT NOTE (updated 3/23)      Date: 2025          Patient Name:  Tani Diez :  1985   Medical   Diagnosis:  S/P rotator cuff repair [Z98.890] Treatment Diagnosis:  M25.512  LEFT SHOULDER PAIN    Referral Source:  Robyn Rosa* Insurance:   Payor: GENERIC SELF-INSURED / Plan: GENERIC SELF-INSURED WC / Product Type: *No Product type* /                     Patient  verified yes     Visit #   Current  / Total 4 12   Time   In / Out 315 400   Total Treatment Time 45   Total Timed Codes 45         SUBJECTIVE    Pain Level (0-10 scale): 7    Any medication changes, allergies to medications, adverse drug reactions, diagnosis change, or new procedure performed?: [x] No    [] Yes (see summary sheet for update)  Medications: Verified on Patient Summary List    Subjective functional status/changes:     Shoulder hurts all the time; pain interrupts sleep;pain along lateral neck.    OBJECTIVE      Therapeutic Procedures:  Tx Min Billable or 1:1 Min (if diff from Tx Min) Procedure, Rationale, Specifics   45  75992 Therapeutic Exercise (timed):  increase ROM, strength, coordination, balance, and proprioception to improve patient's ability to progress to PLOF and address remaining functional goals. (see flow sheet as applicable)     Details if applicable:       58502 Manual Therapy (timed):  decrease pain and increase ROM to improve patient's ability to progress to PLOF and address remaining functional goals.  The manual therapy interventions were performed at a separate and distinct time from the therapeutic activities interventions . (see flow sheet as applicable)     Details if applicable:           Details if applicable:           Details if applicable:            Details if applicable:     45     Total Total         [x]  Patient Education billed concurrently with other procedures   [x] Review HEP  pullovers, dps  [] Progressed/Changed HEP, detail:    [] Other

## 2025-02-28 ENCOUNTER — OFFICE VISIT (OUTPATIENT)
Age: 40
End: 2025-02-28

## 2025-02-28 VITALS — HEIGHT: 62 IN | BODY MASS INDEX: 32.01 KG/M2

## 2025-02-28 DIAGNOSIS — Z98.890 S/P ROTATOR CUFF REPAIR: Primary | ICD-10-CM

## 2025-02-28 DIAGNOSIS — Z47.89 UNSPECIFIED ORTHOPEDIC AFTERCARE: ICD-10-CM

## 2025-02-28 PROCEDURE — 99024 POSTOP FOLLOW-UP VISIT: CPT | Performed by: ORTHOPAEDIC SURGERY

## 2025-02-28 RX ORDER — NAPROXEN 500 MG/1
500 TABLET ORAL 2 TIMES DAILY WITH MEALS
Qty: 90 TABLET | Refills: 2 | Status: SHIPPED | OUTPATIENT
Start: 2025-02-28

## 2025-02-28 ASSESSMENT — PATIENT HEALTH QUESTIONNAIRE - PHQ9
2. FEELING DOWN, DEPRESSED OR HOPELESS: SEVERAL DAYS
SUM OF ALL RESPONSES TO PHQ QUESTIONS 1-9: 2
2. FEELING DOWN, DEPRESSED OR HOPELESS: SEVERAL DAYS
SUM OF ALL RESPONSES TO PHQ QUESTIONS 1-9: 2
SUM OF ALL RESPONSES TO PHQ QUESTIONS 1-9: 2
SUM OF ALL RESPONSES TO PHQ9 QUESTIONS 1 & 2: 2
SUM OF ALL RESPONSES TO PHQ QUESTIONS 1-9: 2
SUM OF ALL RESPONSES TO PHQ9 QUESTIONS 1 & 2: 2
1. LITTLE INTEREST OR PLEASURE IN DOING THINGS: SEVERAL DAYS
1. LITTLE INTEREST OR PLEASURE IN DOING THINGS: SEVERAL DAYS
SUM OF ALL RESPONSES TO PHQ QUESTIONS 1-9: 2
SUM OF ALL RESPONSES TO PHQ QUESTIONS 1-9: 2

## 2025-02-28 NOTE — PROGRESS NOTES
Identified pt with two pt identifiers (name and ). Reviewed chart in preparation for visit and have obtained necessary documentation.     Tani Diez is a 39 y.o. male    Chief Complaint   Patient presents with    Follow-up     Left shoulder      Ht 1.575 m (5' 2\")   BMI 32.01 kg/m²      1. Have you been to the ER, urgent care clinic since your last visit?  Hospitalized since your last visit?no     2. Have you seen or consulted any other health care providers outside of [facility] since your last visit?  Include any pap smears or colon screening. no

## 2025-02-28 NOTE — PROGRESS NOTES
Mr.Pucheta Diez  is here for a follow up visit after undergoing Left Shoulder Arthroscopy With Rotator Cuff Repair, Debridement, Open Biceps Tenodesis - Left on 12/31/2024.   S/p  Left Wrist Open Reduction Internal Fixation - Left on 7/30/2024.  Pain has been appropriate since surgery, no major medical complications since surgery. Patient reports pain is controlled on tylenol. He was taking ibuprofen but it gave him headaches, he switched to naproxen which helps more.    The patient denies fevers, chills, nausea, vomiting, numbness/paresthesias.  The patient has been following the weight bearing precautions: NWB LUE out of sling.  The patient started physical therapy. He completed hand therapy for wrist.  He reports some burning pain noted at the lateral deltoid area and occasional pain over his medial elbow when resting on it.    Current Outpatient Medications on File Prior to Visit   Medication Sig Dispense Refill    acetaminophen (TYLENOL) 500 MG tablet Take 1 tablet by mouth every 6 hours as needed for Pain 60 tablet 1    ibuprofen (ADVIL;MOTRIN) 600 MG tablet Take 1 tablet by mouth 3 times daily as needed for Pain (Patient not taking: Reported on 2/28/2025) 90 tablet 2    ondansetron (ZOFRAN-ODT) 4 MG disintegrating tablet Take 1 tablet by mouth every 8 hours as needed for Nausea or Vomiting (Patient not taking: Reported on 1/15/2025) 10 tablet 1    sennosides-docusate sodium (SENOKOT-S) 8.6-50 MG tablet Take 1 tablet by mouth 2 times daily as needed for Constipation (Patient not taking: Reported on 1/15/2025) 30 tablet 0     No current facility-administered medications on file prior to visit.       ROS:  General: denies agitation, fevers/chills  Cardiac: denies major chest pain  Gastrointestinal: denies nausea/vomiting  Neurologic: Denies numbness/paresthesias  Skin: Denies erythema, warmth to touch, active drainage  Musculoskeletal: Endorses appropriate pain at surgical site      Physical

## 2025-03-14 ENCOUNTER — HOSPITAL ENCOUNTER (OUTPATIENT)
Facility: HOSPITAL | Age: 40
Setting detail: RECURRING SERIES
Discharge: HOME | End: 2025-03-17
Attending: ORTHOPAEDIC SURGERY
Payer: COMMERCIAL

## 2025-03-14 PROCEDURE — 97110 THERAPEUTIC EXERCISES: CPT

## 2025-03-14 NOTE — PROGRESS NOTES
PHYSICAL THERAPY - DAILY TREATMENT NOTE (updated 3/23)      Date: 3/14/2025          Patient Name:  Tani Diez :  1985   Medical   Diagnosis:  S/P rotator cuff repair [Z98.890] Treatment Diagnosis:  M25.512  LEFT SHOULDER PAIN    Referral Source:  Robyn Rosa* Insurance:   Payor: GENERIC SELF-INSURED / Plan: GENERIC SELF-INSURED WC / Product Type: *No Product type* /                     Patient  verified yes     Visit #   Current  / Total 6 12   Time   In / Out 305 350   Total Treatment Time 45   Total Timed Codes 44         SUBJECTIVE    Pain Level (0-10 scale): 5    Any medication changes, allergies to medications, adverse drug reactions, diagnosis change, or new procedure performed?: [x] No    [] Yes (see summary sheet for update)  Medications: Verified on Patient Summary List    Subjective functional status/changes:     Shoulder hurts all the time; pain interrupts sleep;pain along lateral neck.    OBJECTIVE      Therapeutic Procedures:  Tx Min Billable or 1:1 Min (if diff from Tx Min) Procedure, Rationale, Specifics   44  56896 Therapeutic Exercise (timed):  increase ROM, strength, coordination, balance, and proprioception to improve patient's ability to progress to PLOF and address remaining functional goals. (see flow sheet as applicable)     Details if applicable:       56050 Manual Therapy (timed):  decrease pain and increase ROM to improve patient's ability to progress to PLOF and address remaining functional goals.  The manual therapy interventions were performed at a separate and distinct time from the therapeutic activities interventions . (see flow sheet as applicable)     Details if applicable:           Details if applicable:           Details if applicable:            Details if applicable:     44     Total Total         [x]  Patient Education billed concurrently with other procedures   [x] Review HEP  pullovers, dps  [] Progressed/Changed HEP, detail:    [] Other

## 2025-03-24 ENCOUNTER — HOSPITAL ENCOUNTER (OUTPATIENT)
Facility: HOSPITAL | Age: 40
Setting detail: RECURRING SERIES
Discharge: HOME | End: 2025-03-27
Attending: ORTHOPAEDIC SURGERY
Payer: COMMERCIAL

## 2025-03-24 PROCEDURE — 97110 THERAPEUTIC EXERCISES: CPT

## 2025-03-24 NOTE — PROGRESS NOTES
PHYSICAL THERAPY - DAILY TREATMENT NOTE (updated 3/23)      Date: 3/24/2025          Patient Name:  Tani Diez :  1985   Medical   Diagnosis:  S/P rotator cuff repair [Z98.890] Treatment Diagnosis:  M25.512  LEFT SHOULDER PAIN    Referral Source:  Robyn Rosa* Insurance:   Payor: GENERIC SELF-INSURED / Plan: GENERIC SELF-INSURED WC / Product Type: *No Product type* /                     Patient  verified yes     Visit #   Current  / Total 7 12   Time   In / Out 315 400   Total Treatment Time 45   Total Timed Codes 38         SUBJECTIVE    Pain Level (0-10 scale): 5    Any medication changes, allergies to medications, adverse drug reactions, diagnosis change, or new procedure performed?: [x] No    [] Yes (see summary sheet for update)  Medications: Verified on Patient Summary List    Subjective functional status/changes:     Shoulder hurts all the time; pain interrupts sleep;pain along lateral neck.    OBJECTIVE      Therapeutic Procedures:  Tx Min Billable or 1:1 Min (if diff from Tx Min) Procedure, Rationale, Specifics   38  63434 Therapeutic Exercise (timed):  increase ROM, strength, coordination, balance, and proprioception to improve patient's ability to progress to PLOF and address remaining functional goals. (see flow sheet as applicable)     Details if applicable:       62745 Manual Therapy (timed):  decrease pain and increase ROM to improve patient's ability to progress to PLOF and address remaining functional goals.  The manual therapy interventions were performed at a separate and distinct time from the therapeutic activities interventions . (see flow sheet as applicable)     Details if applicable:           Details if applicable:           Details if applicable:            Details if applicable:     38     Total Total         [x]  Patient Education billed concurrently with other procedures   [x] Review HEP  pullovers, dps  [] Progressed/Changed HEP, detail:    [] Other

## 2025-03-26 ENCOUNTER — APPOINTMENT (OUTPATIENT)
Facility: HOSPITAL | Age: 40
End: 2025-03-26
Attending: ORTHOPAEDIC SURGERY
Payer: COMMERCIAL

## 2025-04-01 ENCOUNTER — HOSPITAL ENCOUNTER (OUTPATIENT)
Facility: HOSPITAL | Age: 40
Setting detail: RECURRING SERIES
Discharge: HOME | End: 2025-04-04
Attending: ORTHOPAEDIC SURGERY
Payer: COMMERCIAL

## 2025-04-01 PROCEDURE — 97110 THERAPEUTIC EXERCISES: CPT

## 2025-04-01 NOTE — PROGRESS NOTES
[facility]  [address], [address]  [place] [address]  Phone: [phone]    Fax: [phone]    PHYSICAL THERAPY PROGRESS NOTE  Patient Name:  Tani Diez :  1985   Treatment/Medical Diagnosis: S/P rotator cuff repair [Z98.890]   Referral Source:  Robyn Rosa*     Date of Initial Visit:  2/3/25 Attended Visits:  8 Missed Visits:       SUMMARY OF TREATMENT/ASSESSMENT  /  CURRENT STATUS/GOALS    As of 25:  Hand  114 lb right 85 lb left  Reach overhead  max R=L=78\"  Supine prom shoulder flex    abd'n    ER    IR                          Right   170    180      110     45                          Left      148    113       50      45  Reach behind back to vertebrae:                          Right to T9                           Left  to sacrum  Pronation/supination wfl  Wrist ext'n wfl  Wrist flex 60 d. Right, 35 d. left    Short Term Goals: To be accomplished in 10 treatments.  MMT left shoulder flex/reach 3+/5   ; met 25  Long Term Goals: To be accomplished in 12 treatments.  Restore to reach/lift/carry/self care /wash hair ADLs; as of 25 making progress but not there yet    RECOMMENDATIONS FOR SKILLED THERAPY  Continue PT        Michael Hebert, PT       2025       1:11 PM    If you have any questions/comments please contact us directly at [phone].   Thank you for allowing us to assist in the care of your patient.

## 2025-04-01 NOTE — PROGRESS NOTES
PHYSICAL THERAPY - DAILY TREATMENT NOTE (updated 3/23)      Date: 2025          Patient Name:  Tani Diez :  1985   Medical   Diagnosis:  S/P rotator cuff repair [Z98.890] Treatment Diagnosis:  M25.512  LEFT SHOULDER PAIN    Referral Source:  Robyn Rosa* Insurance:   Payor: GENERIC SELF-INSURED / Plan: GENERIC SELF-INSURED WC / Product Type: *No Product type* /                     Patient  verified yes     Visit #   Current  / Total 8 12   Time   In / Out 100 145   Total Treatment Time 45   Total Timed Codes 40         SUBJECTIVE    Pain Level (0-10 scale): 5    Any medication changes, allergies to medications, adverse drug reactions, diagnosis change, or new procedure performed?: [x] No    [] Yes (see summary sheet for update)  Medications: Verified on Patient Summary List    Subjective functional status/changes:     Shoulder hurts all the time; pain interrupts sleep;pain along lateral neck.    OBJECTIVE      Therapeutic Procedures:  Tx Min Billable or 1:1 Min (if diff from Tx Min) Procedure, Rationale, Specifics   40  68926 Therapeutic Exercise (timed):  increase ROM, strength, coordination, balance, and proprioception to improve patient's ability to progress to PLOF and address remaining functional goals. (see flow sheet as applicable)     Details if applicable:       25865 Manual Therapy (timed):  decrease pain and increase ROM to improve patient's ability to progress to PLOF and address remaining functional goals.  The manual therapy interventions were performed at a separate and distinct time from the therapeutic activities interventions . (see flow sheet as applicable)     Details if applicable:           Details if applicable:           Details if applicable:            Details if applicable:     40     Total Total         [x]  Patient Education billed concurrently with other procedures   [x] Review HEP  pullovers, dps  [] Progressed/Changed HEP, detail:    [] Other

## 2025-04-08 ENCOUNTER — HOSPITAL ENCOUNTER (OUTPATIENT)
Facility: HOSPITAL | Age: 40
Setting detail: RECURRING SERIES
Discharge: HOME | End: 2025-04-11
Attending: ORTHOPAEDIC SURGERY
Payer: COMMERCIAL

## 2025-04-08 PROCEDURE — 97110 THERAPEUTIC EXERCISES: CPT

## 2025-04-08 PROCEDURE — 97140 MANUAL THERAPY 1/> REGIONS: CPT

## 2025-04-08 NOTE — PROGRESS NOTES
PHYSICAL THERAPY - DAILY TREATMENT NOTE (updated 3/23)      Date: 2025          Patient Name:  Tani Diez :  1985   Medical   Diagnosis:  S/P rotator cuff repair [Z98.890] Treatment Diagnosis:  M25.512  LEFT SHOULDER PAIN    Referral Source:  Robyn Rosa* Insurance:   Payor: GENERIC SELF-INSURED / Plan: GENERIC SELF-INSURED WC / Product Type: *No Product type* /                     Patient  verified yes     Visit #   Current  / Total 9 12   Time   In / Out 1103 1149   Total Treatment Time 46   Total Timed Codes 45         SUBJECTIVE    Pain Level (0-10 scale): 5    Any medication changes, allergies to medications, adverse drug reactions, diagnosis change, or new procedure performed?: [x] No    [] Yes (see summary sheet for update)  Medications: Verified on Patient Summary List    Subjective functional status/changes:     Shoulder hurts all the time; pain interrupts sleep;pain along lateral neck.    OBJECTIVE      Therapeutic Procedures:  Tx Min Billable or 1:1 Min (if diff from Tx Min) Procedure, Rationale, Specifics   35  90919 Therapeutic Exercise (timed):  increase ROM, strength, coordination, balance, and proprioception to improve patient's ability to progress to PLOF and address remaining functional goals. (see flow sheet as applicable)     Details if applicable:     10  15217 Manual Therapy (timed):  decrease pain and increase ROM to improve patient's ability to progress to PLOF and address remaining functional goals.  The manual therapy interventions were performed at a separate and distinct time from the therapeutic activities interventions . (see flow sheet as applicable)     Details if applicable:  STM to UT and deltoid         Details if applicable:           Details if applicable:            Details if applicable:     45     Total Total         [x]  Patient Education billed concurrently with other procedures   [x] Review HEP  pullovers, dps  [] Progressed/Changed

## 2025-04-10 ENCOUNTER — HOSPITAL ENCOUNTER (OUTPATIENT)
Facility: HOSPITAL | Age: 40
Setting detail: RECURRING SERIES
Discharge: HOME | End: 2025-04-13
Attending: ORTHOPAEDIC SURGERY
Payer: COMMERCIAL

## 2025-04-10 PROCEDURE — 97140 MANUAL THERAPY 1/> REGIONS: CPT

## 2025-04-10 PROCEDURE — 97110 THERAPEUTIC EXERCISES: CPT

## 2025-04-10 NOTE — PROGRESS NOTES
PHYSICAL THERAPY - DAILY TREATMENT NOTE (updated 3/23)      Date: 4/10/2025          Patient Name:  Tani Diez :  1985   Medical   Diagnosis:  S/P rotator cuff repair [Z98.890] Treatment Diagnosis:  M25.512  LEFT SHOULDER PAIN    Referral Source:  Robyn Rosa* Insurance:   Payor: GENERIC SELF-INSURED / Plan: GENERIC SELF-INSURED WC / Product Type: *No Product type* /                     Patient  verified yes     Visit #   Current  / Total 10 12   Time   In / Out 1020 1100   Total Treatment Time 40   Total Timed Codes 40         SUBJECTIVE    Pain Level (0-10 scale): 5    Any medication changes, allergies to medications, adverse drug reactions, diagnosis change, or new procedure performed?: [x] No    [] Yes (see summary sheet for update)  Medications: Verified on Patient Summary List    Subjective functional status/changes:     No change.    OBJECTIVE      Therapeutic Procedures:  Tx Min Billable or 1:1 Min (if diff from Tx Min) Procedure, Rationale, Specifics   30  41256 Therapeutic Exercise (timed):  increase ROM, strength, coordination, balance, and proprioception to improve patient's ability to progress to PLOF and address remaining functional goals. (see flow sheet as applicable)     Details if applicable:     10  06538 Manual Therapy (timed):  decrease pain and increase ROM to improve patient's ability to progress to PLOF and address remaining functional goals.  The manual therapy interventions were performed at a separate and distinct time from the therapeutic activities interventions . (see flow sheet as applicable)     Details if applicable:  STM to UT and deltoid         Details if applicable:           Details if applicable:            Details if applicable:     40     Total Total         [x]  Patient Education billed concurrently with other procedures   [x] Review HEP  pullovers, dps  [] Progressed/Changed HEP, detail:    [] Other detail:         Other

## 2025-04-11 ENCOUNTER — OFFICE VISIT (OUTPATIENT)
Age: 40
End: 2025-04-11

## 2025-04-11 DIAGNOSIS — Z47.89 UNSPECIFIED ORTHOPEDIC AFTERCARE: ICD-10-CM

## 2025-04-11 DIAGNOSIS — Z98.890 S/P ROTATOR CUFF REPAIR: Primary | ICD-10-CM

## 2025-04-11 RX ORDER — NAPROXEN 500 MG/1
500 TABLET ORAL 2 TIMES DAILY WITH MEALS
Qty: 90 TABLET | Refills: 2 | Status: SHIPPED | OUTPATIENT
Start: 2025-04-11

## 2025-04-11 ASSESSMENT — PATIENT HEALTH QUESTIONNAIRE - PHQ9
1. LITTLE INTEREST OR PLEASURE IN DOING THINGS: NOT AT ALL
SUM OF ALL RESPONSES TO PHQ QUESTIONS 1-9: 0
2. FEELING DOWN, DEPRESSED OR HOPELESS: NOT AT ALL
SUM OF ALL RESPONSES TO PHQ QUESTIONS 1-9: 0

## 2025-04-11 NOTE — PROGRESS NOTES
Identified pt with two pt identifiers (name and ). Reviewed chart in preparation for visit and have obtained necessary documentation.     Tani Diez is a 40 y.o. male Follow-up (FU left shoulder , having some pain in the shoulder and it shoots down into his elbow , around his shoulder is hot to the touch ,for his to move his arm up and down it causing his some discomfort , he was in a accident last year at his job and he is still having a lot of pain )  .           1. Have you been to the ER, urgent care clinic since your last visit?  Hospitalized since your last visit?  no    2. Have you seen or consulted any other health care providers outside of the [facility] since your last visit?  Include any pap smears or colon screening.  no

## 2025-04-11 NOTE — PROGRESS NOTES
Mr.Pucheta Diez  is here for a follow up visit after undergoing Left Shoulder Arthroscopy With Rotator Cuff Repair, Debridement, Open Biceps Tenodesis - Left on 12/31/2024.   S/p  Left Wrist Open Reduction Internal Fixation - Left on 7/30/2024.   Patient reports pain is somewhat controlled on naproxen.    The patient denies fevers, chills, nausea, vomiting, numbness/paresthesias.  The patient has been following the weight bearing precautions: WBAT LUE.  The patient is doing physical therapy. He completed hand therapy for wrist.  He reports some throbbing/burning pain noted at the lateral deltoid area and trouble sleeping on left side.  He reports some continued wrist pain.    Current Outpatient Medications on File Prior to Visit   Medication Sig Dispense Refill    naproxen (NAPROSYN) 500 MG tablet Take 1 tablet by mouth 2 times daily (with meals) 90 tablet 2    acetaminophen (TYLENOL) 500 MG tablet Take 1 tablet by mouth every 6 hours as needed for Pain 60 tablet 1    ibuprofen (ADVIL;MOTRIN) 600 MG tablet Take 1 tablet by mouth 3 times daily as needed for Pain (Patient not taking: Reported on 4/11/2025) 90 tablet 2    ondansetron (ZOFRAN-ODT) 4 MG disintegrating tablet Take 1 tablet by mouth every 8 hours as needed for Nausea or Vomiting (Patient not taking: Reported on 4/11/2025) 10 tablet 1    sennosides-docusate sodium (SENOKOT-S) 8.6-50 MG tablet Take 1 tablet by mouth 2 times daily as needed for Constipation (Patient not taking: Reported on 4/11/2025) 30 tablet 0     No current facility-administered medications on file prior to visit.       ROS:  General: denies agitation, fevers/chills  Cardiac: denies major chest pain  Gastrointestinal: denies nausea/vomiting  Neurologic: Denies numbness/paresthesias  Skin: Denies erythema, warmth to touch, active drainage  Musculoskeletal: Endorses appropriate pain at surgical site      Physical Examination:    There were no vitals taken for this visit.    GENERAL:

## 2025-04-15 ENCOUNTER — HOSPITAL ENCOUNTER (OUTPATIENT)
Facility: HOSPITAL | Age: 40
Setting detail: RECURRING SERIES
Discharge: HOME | End: 2025-04-18
Attending: ORTHOPAEDIC SURGERY
Payer: COMMERCIAL

## 2025-04-15 PROCEDURE — 97110 THERAPEUTIC EXERCISES: CPT

## 2025-04-15 NOTE — PROGRESS NOTES
PHYSICAL THERAPY - DAILY TREATMENT NOTE (updated 3/23)      Date: 4/15/2025          Patient Name:  Tani Diez :  1985   Medical   Diagnosis:  S/P rotator cuff repair [Z98.890] Treatment Diagnosis:  M25.512  LEFT SHOULDER PAIN    Referral Source:  Robyn Rosa* Insurance:   Payor: GENERIC SELF-INSURED / Plan: GENERIC SELF-INSURED WC / Product Type: *No Product type* /                     Patient  verified yes     Visit #   Current  / Total 11 12   Time   In / Out 1045 1130   Total Treatment Time 40   Total Timed Codes 40         SUBJECTIVE    Pain Level (0-10 scale): 4    Any medication changes, allergies to medications, adverse drug reactions, diagnosis change, or new procedure performed?: [x] No    [] Yes (see summary sheet for update)  Medications: Verified on Patient Summary List    Subjective functional status/changes:     No change.    OBJECTIVE      Therapeutic Procedures:  Tx Min Billable or 1:1 Min (if diff from Tx Min) Procedure, Rationale, Specifics   40  25538 Therapeutic Exercise (timed):  increase ROM, strength, coordination, balance, and proprioception to improve patient's ability to progress to PLOF and address remaining functional goals. (see flow sheet as applicable)     Details if applicable:       38365 Manual Therapy (timed):  decrease pain and increase ROM to improve patient's ability to progress to PLOF and address remaining functional goals.  The manual therapy interventions were performed at a separate and distinct time from the therapeutic activities interventions . (see flow sheet as applicable)     Details if applicable:  STM to UT and deltoid         Details if applicable:           Details if applicable:            Details if applicable:     40     Total Total         [x]  Patient Education billed concurrently with other procedures   [x] Review HEP  pullovers, dps  [] Progressed/Changed HEP, detail:    [] Other detail:         Other

## 2025-04-17 ENCOUNTER — HOSPITAL ENCOUNTER (OUTPATIENT)
Facility: HOSPITAL | Age: 40
Setting detail: RECURRING SERIES
Discharge: HOME | End: 2025-04-20
Attending: ORTHOPAEDIC SURGERY
Payer: COMMERCIAL

## 2025-04-17 PROCEDURE — 97110 THERAPEUTIC EXERCISES: CPT

## 2025-04-17 NOTE — PROGRESS NOTES
PHYSICAL THERAPY - DAILY TREATMENT NOTE (updated 3/23)      Date: 2025          Patient Name:  Tani Diez :  1985   Medical   Diagnosis:  S/P rotator cuff repair [Z98.890] Treatment Diagnosis:  M25.512  LEFT SHOULDER PAIN    Referral Source:  Robyn Rosa* Insurance:   Payor: GENERIC SELF-INSURED / Plan: GENERIC SELF-INSURED WC / Product Type: *No Product type* /                     Patient  verified yes     Visit #   Current  / Total 12 12   Time   In / Out 1050 1135   Total Treatment Time 45   Total Timed Codes 40         SUBJECTIVE    Pain Level (0-10 scale): 4    Any medication changes, allergies to medications, adverse drug reactions, diagnosis change, or new procedure performed?: [x] No    [] Yes (see summary sheet for update)  Medications: Verified on Patient Summary List    Subjective functional status/changes:     No change.    OBJECTIVE      Therapeutic Procedures:  Tx Min Billable or 1:1 Min (if diff from Tx Min) Procedure, Rationale, Specifics   40  93038 Therapeutic Exercise (timed):  increase ROM, strength, coordination, balance, and proprioception to improve patient's ability to progress to PLOF and address remaining functional goals. (see flow sheet as applicable)     Details if applicable:       27732 Manual Therapy (timed):  decrease pain and increase ROM to improve patient's ability to progress to PLOF and address remaining functional goals.  The manual therapy interventions were performed at a separate and distinct time from the therapeutic activities interventions . (see flow sheet as applicable)     Details if applicable:  STM to UT and deltoid         Details if applicable:           Details if applicable:            Details if applicable:     40     Total Total         [x]  Patient Education billed concurrently with other procedures   [x] Review HEP  pullovers, dps  [] Progressed/Changed HEP, detail:    [] Other detail:         Other

## 2025-04-23 ENCOUNTER — TELEPHONE (OUTPATIENT)
Age: 40
End: 2025-04-23

## 2025-04-23 NOTE — TELEPHONE ENCOUNTER
Placed an outbound call to the patient attempting to schedule his 2 mo FU with Dr. Peña at 11:20 am on 6/12/25. No answer, LVM with .

## 2025-06-12 ENCOUNTER — OFFICE VISIT (OUTPATIENT)
Age: 40
End: 2025-06-12

## 2025-06-12 DIAGNOSIS — Z98.890 S/P ROTATOR CUFF REPAIR: Primary | ICD-10-CM

## 2025-06-12 RX ORDER — TRIAMCINOLONE ACETONIDE 40 MG/ML
40 INJECTION, SUSPENSION INTRA-ARTICULAR; INTRAMUSCULAR ONCE
Status: COMPLETED | OUTPATIENT
Start: 2025-06-12 | End: 2025-06-12

## 2025-06-12 RX ADMIN — TRIAMCINOLONE ACETONIDE 40 MG: 40 INJECTION, SUSPENSION INTRA-ARTICULAR; INTRAMUSCULAR at 10:19

## 2025-06-12 ASSESSMENT — PATIENT HEALTH QUESTIONNAIRE - PHQ9
SUM OF ALL RESPONSES TO PHQ QUESTIONS 1-9: 0
SUM OF ALL RESPONSES TO PHQ QUESTIONS 1-9: 0
2. FEELING DOWN, DEPRESSED OR HOPELESS: NOT AT ALL
1. LITTLE INTEREST OR PLEASURE IN DOING THINGS: NOT AT ALL
SUM OF ALL RESPONSES TO PHQ QUESTIONS 1-9: 0
SUM OF ALL RESPONSES TO PHQ QUESTIONS 1-9: 0

## 2025-06-12 NOTE — PROGRESS NOTES
Identified pt with two pt identifiers (name and ). Reviewed chart in preparation for visit and have obtained necessary documentation.     Tani Diez is a 40 y.o. male Follow-up (Left shoulder pain - still having some pain in the shoulder , it is a constant pain , when he is moving around he is having more pain , having issues sleeping from the pressure on the shoulder laying down flat , shoulder is warm to the touch)  .           1. Have you been to the ER, urgent care clinic since your last visit?  Hospitalized since your last visit?  no    2. Have you seen or consulted any other health care providers outside of the [facility] since your last visit?  Include any pap smears or colon screening.  no       
details:     Dressing:  Adhesive bandage    Procedure completion:  Tolerated well, no immediate complications  Comments:      Procedure: Left shoulder subacromial corticosteroid injection    After a timeout was performed including a discussion regarding allergies then discussion of risks/benefits including but not limited to bleeding and infection, skin hypopigmentation and/or necrosis, a mixture of 2 cc of 1% lidocaine without epi, 2 cc of 0.25% marcaine and 40 mg of Kenalog (1 cc) was injected into the left subacromial bursa using a 22-gauge, 1.5 inch needle without complication after Chlorhexidine/EtOH prep with ethyl chloride anesthesia. Well tolerated by patient without complication. Patient advised to ice the affected extremity for 15 minute intervals over the next 48 hours.        Assessment & Plan      Assessment & Plan  S/P rotator cuff repair  - Continue formal PT 3x per week for 4 additional week. New referral placed  - WBATthrough LUE  - CSI to left subacromial space as above  - Updated work restrictions to 20 lbs lifting, pulling, pushing, no overhead work  - Follow up in 6 weeks    An electronic signature was used to authenticate this note.    --Karissa Peña, DO

## 2025-06-12 NOTE — ASSESSMENT & PLAN NOTE
- Continue formal PT 3x per week for 4 additional week. New referral placed  - WBATthrough LUE  - CSI to left subacromial space as above  - Updated work restrictions to 20 lbs lifting, pulling, pushing, no overhead work  - Follow up in 6 weeks

## 2025-07-25 ENCOUNTER — TELEPHONE (OUTPATIENT)
Age: 40
End: 2025-07-25

## 2025-07-25 ENCOUNTER — OFFICE VISIT (OUTPATIENT)
Age: 40
End: 2025-07-25

## 2025-07-25 DIAGNOSIS — Z98.890 S/P ROTATOR CUFF REPAIR: Primary | ICD-10-CM

## 2025-07-25 DIAGNOSIS — S52.572D OTHER CLOSED INTRA-ARTICULAR FRACTURE OF DISTAL END OF LEFT RADIUS WITH ROUTINE HEALING, SUBSEQUENT ENCOUNTER: ICD-10-CM

## 2025-07-25 ASSESSMENT — PATIENT HEALTH QUESTIONNAIRE - PHQ9
1. LITTLE INTEREST OR PLEASURE IN DOING THINGS: NOT AT ALL
SUM OF ALL RESPONSES TO PHQ QUESTIONS 1-9: 1
2. FEELING DOWN, DEPRESSED OR HOPELESS: SEVERAL DAYS
SUM OF ALL RESPONSES TO PHQ QUESTIONS 1-9: 1

## 2025-07-25 NOTE — TELEPHONE ENCOUNTER
Called [facility] w/c regarding injured Left Shoulder. Confirmed that  will be coming to the office with patient and she will be writing a letter of authorization stating that the patient is covered for Left shoulder under w/c . Milly will fax the letter once she is in the office.

## 2025-07-25 NOTE — PROGRESS NOTES
Identified pt with two pt identifiers (name and ). Reviewed chart in preparation for visit and have obtained necessary documentation.    Tani Diez is a 40 y.o. male Follow-up (S/P rotator left cuff repair- Still having pain when lift or stretch the arm up and out. Completed PT on the . Can stretch and lift about 85 degree angle without feeling something \"inflating or stretching that causes pain\" He feels he can't lift or move items with weight without pain. He is concerned as to why he did not get relief with the injection in . He says it helped but not removed the pain. He says he was told after 6 months there would be no pain. /)  .    There were no vitals filed for this visit.       1. Have you been to the ER, urgent care clinic since your last visit?  Hospitalized since your last visit?  no     2. Have you seen or consulted any other health care providers outside of the [facility] since your last visit?  Include any pap smears or colon screening.  no

## 2025-07-25 NOTE — PROGRESS NOTES
Mr.Pucheta Diez  is here for a follow up visit after undergoing Left Shoulder Arthroscopy With Rotator Cuff Repair, Debridement, Open Biceps Tenodesis - Left on 12/31/2024.   S/p  Left Wrist Open Reduction Internal Fixation - Left on 7/30/2024.   Patient reports pain is somewhat controlled on naproxen.    The patient reports continued soreness that is worse with movements. He reports continued soreness/decreased ROM of left wrist  The patient completed physical therapy recently and they recommended work hardening/conditioning.   Current Outpatient Medications on File Prior to Visit   Medication Sig Dispense Refill    naproxen (NAPROSYN) 500 MG tablet Take 1 tablet by mouth 2 times daily (with meals) 90 tablet 2    acetaminophen (TYLENOL) 500 MG tablet Take 1 tablet by mouth every 6 hours as needed for Pain (Patient not taking: Reported on 7/25/2025) 60 tablet 1    ibuprofen (ADVIL;MOTRIN) 600 MG tablet Take 1 tablet by mouth 3 times daily as needed for Pain (Patient not taking: Reported on 7/25/2025) 90 tablet 2    ondansetron (ZOFRAN-ODT) 4 MG disintegrating tablet Take 1 tablet by mouth every 8 hours as needed for Nausea or Vomiting (Patient not taking: Reported on 7/25/2025) 10 tablet 1    sennosides-docusate sodium (SENOKOT-S) 8.6-50 MG tablet Take 1 tablet by mouth 2 times daily as needed for Constipation (Patient not taking: Reported on 7/25/2025) 30 tablet 0     No current facility-administered medications on file prior to visit.       ROS:  General: denies agitation, fevers/chills  Cardiac: denies major chest pain  Gastrointestinal: denies nausea/vomiting  Neurologic: Denies numbness/paresthesias  Skin: Denies erythema, warmth to touch, active drainage  Musculoskeletal: Endorses appropriate pain at surgical site      Physical Examination:    There were no vitals taken for this visit.    GENERAL: Patient is a healthy-appearing and in no apparent distress. Afebrile, normotensive, regular rate  MENTAL STATUS:

## (undated) DEVICE — BLADE,CARBON-STEEL,11,STRL,DISPOSABLE,TB: Brand: MEDLINE

## (undated) DEVICE — GARMENT,MEDLINE,DVT,INT,CALF,MED, GEN2: Brand: MEDLINE

## (undated) DEVICE — SHOULDER ARTHROSCOPY: Brand: MEDLINE INDUSTRIES, INC.

## (undated) DEVICE — HYPODERMIC SAFETY NEEDLE: Brand: MONOJECT

## (undated) DEVICE — BANDAGE GAUZE 2NW X 4.1DL STRTCHD RAYON PLSTR CNFRMBLE STRTC

## (undated) DEVICE — POUCH FLD 36X29IN SHLDR HVY LO GLARE MATTE FINISH FLM 2 SUCT

## (undated) DEVICE — Device

## (undated) DEVICE — PADDING UNDERCAST W4INXL12FT RAYON POLY SYN NONADHESIVE

## (undated) DEVICE — SUTURE VICRYL + SZ 3-0 L27IN ABSRB UD L26MM SH 1/2 CIR VCP416H

## (undated) DEVICE — BANDAGE COBAN 4 IN COMPR W4INXL5YD FOAM COHESIVE QUIK STK SELF ADH SFT

## (undated) DEVICE — SCREW BNE L20MM DIA2.7MM CORT S STL ST T8 STARDRV RECESS: Type: IMPLANTABLE DEVICE | Site: WRIST | Status: NON-FUNCTIONAL

## (undated) DEVICE — 3M™ STERI-STRIP™ REINFORCED ADHESIVE SKIN CLOSURES, R1547, 1/2 IN X 4 IN (12 MM X 100 MM), 6 STRIPS/ENVELOPE: Brand: 3M™ STERI-STRIP™

## (undated) DEVICE — TOWEL,OR,DSP,ST,BLUE,STD,4/PK,20PK/CS: Brand: MEDLINE

## (undated) DEVICE — PAD,ABDOMINAL,8"X7.5",STERILE,LF,1/PK: Brand: MEDLINE

## (undated) DEVICE — APPLICATOR MEDICATED 26 CC SOLUTION HI LT ORNG CHLORAPREP

## (undated) DEVICE — GLOVE SURG SZ 65 L12IN FNGR THK79MIL GRN LTX FREE

## (undated) DEVICE — NEEDLE SUT PASS FOR ROT CUF LABRAL REP MULTFI SCORPION

## (undated) DEVICE — MASTISOL ADHESIVE LIQ 2/3ML

## (undated) DEVICE — BIT DRL L100MM DIA2MM QUIK CPL W/O STP REUSE

## (undated) DEVICE — TUBING PMP L8FT LNG W/ CONN FOR AR-6400 REDEUCE

## (undated) DEVICE — BANDAGE,ELASTIC,ESMARK,STERILE,4"X9',LF: Brand: MEDLINE

## (undated) DEVICE — SYRINGE MED 10ML LUERLOCK TIP W/O SFTY DISP

## (undated) DEVICE — PREP PAD BNS: Brand: CONVERTORS

## (undated) DEVICE — STRIP,CLOSURE,WOUND,MEDI-STRIP,1/2X4: Brand: MEDLINE

## (undated) DEVICE — SHOULDER STABILIZATION KIT,                                    DISPOSABLE 12 PER BOX

## (undated) DEVICE — KIT CHAIR TRIMANO FOAM W/ SUPP ARM DRP ERGONOMICALLY DESIGNED

## (undated) DEVICE — SPONGE GZ W4XL4IN COT 12 PLY TYP VII WVN C FLD DSGN STERILE

## (undated) DEVICE — 3M™ TEGADERM™ TRANSPARENT FILM DRESSING FRAME STYLE, 1629, 8 IN X 12 IN (20 CM X 30 CM), 10/CT 8CT/CASE: Brand: 3M™ TEGADERM™

## (undated) DEVICE — LAMINECTOMY ARM CRADLE FOAM POSITIONER: Brand: CARDINAL HEALTH

## (undated) DEVICE — DRESSING GZ W3XL16IN CELOS ACETT OIL EMUL N ADH

## (undated) DEVICE — PADDING CAST W4INXL4YD NONSTERILE COT RAYON MICROPLEATED

## (undated) DEVICE — SOLUTION IRRIG 500ML 0.9% SOD CHLO USP POUR PLAS BTL

## (undated) DEVICE — TEGADERM ABSORB CLEAR 1 12FT X 2 14IN BX5

## (undated) DEVICE — TUBING PMP L6FT CONT WAVE EXTN

## (undated) DEVICE — GOWN SURG LG 43 IN AAMI LEVEL 3 ORBIS

## (undated) DEVICE — DRAPE,REIN 53X77,STERILE: Brand: MEDLINE

## (undated) DEVICE — TUBE IRRIG L8IN LNG PT W/ CONN FOR PMP SYS REDEUCE

## (undated) DEVICE — Device: Brand: JELCO

## (undated) DEVICE — DRAPE,U/ SHT,SPLIT,PLAS,STERIL: Brand: MEDLINE

## (undated) DEVICE — GLOVE SURG SZ 6 THK91MIL LTX FREE SYN POLYISOPRENE ANTI

## (undated) DEVICE — THE STERILE LIGHT HANDLE COVER IS USED WITH STERIS SURGICAL LIGHTING AND VISUALIZATION SYSTEMS.

## (undated) DEVICE — T-MAX DISPOSABLE FACE MASK 8 PER BOX

## (undated) DEVICE — SOLUTION IRRIG 1000ML STRL H2O USP PLAS POUR BTL

## (undated) DEVICE — DRAPE EQUIP C ARM 74X42 IN MOB XR W/ TIE RUBBER BND LF

## (undated) DEVICE — SOLUTION IRRIG 3000ML 0.9% SOD CHL USP UROMATIC PLAS CONT

## (undated) DEVICE — 3M™ IOBAN™ 2 ANTIMICROBIAL INCISE DRAPE 6650EZ: Brand: IOBAN™ 2

## (undated) DEVICE — BUR SHV L13CM DIA4MM 8 FLUT OVL FOR RAP AGG BNE RESECT

## (undated) DEVICE — ZIMMER® STERILE DISPOSABLE TOURNIQUET CUFF WITH PLC, DUAL PORT, SINGLE BLADDER, 18 IN. (46 CM)

## (undated) DEVICE — SUTURE MONOCRYL + SZ 4-0 L27IN ABSRB UD L19MM PS-2 3/8 CIR MCP426H

## (undated) DEVICE — DRAPE,HAND,STERILE: Brand: MEDLINE

## (undated) DEVICE — CANNULA ARTHSCP L4CM DIA10MM DBL DAM 1 PC MOLD LO PROF FLNG

## (undated) DEVICE — SYRINGE IRRIG 60ML SFT PLIABLE BLB EZ TO GRP 1 HND USE W/

## (undated) DEVICE — SUTURE VICRYL + SZ 2-0 L27IN ABSRB UD CT-2 L26MM 1/2 CIR TAPR VCP269H

## (undated) DEVICE — SLING ARM L ABV 13IN DE-ROTATION STRP HOOKS PROVIDE IMMOB

## (undated) DEVICE — PROBE ABLAT 90DEG ASPIR MULTIPORT BPLR RF 1 PC ELECTRD ERGO

## (undated) DEVICE — STERILE-Z BACK TABLE COVER - EXTRA LARGE: Brand: STERILE-Z

## (undated) DEVICE — UPPER EXTREMITY: Brand: MEDLINE INDUSTRIES, INC.

## (undated) DEVICE — SLING ARM AD UNIV

## (undated) DEVICE — SUTURE PDS + SZ 0 L27IN ABSRB VLT L36MM CT 1 1 2 CIR PDP340H

## (undated) DEVICE — SCREW BNE L26MM DIA2.4MM CORT S STL ST T8 STARDRV RECESS: Type: IMPLANTABLE DEVICE | Site: WRIST | Status: NON-FUNCTIONAL

## (undated) DEVICE — FLOOR PAD ICE BLUE BURNISHING UHS 27IN

## (undated) DEVICE — BIT DRL L110MM DIA1.8MM QUIK CPL CALIB W/O STP REUSE

## (undated) DEVICE — DRAPE SURG TOWEL SM 18X12 IN INVISISHIELD MP W/ ADH PLAS NS

## (undated) DEVICE — PENCIL ELECTROCAUTERY 10 FT BLADE